# Patient Record
Sex: MALE | Race: WHITE | NOT HISPANIC OR LATINO | Employment: FULL TIME | URBAN - METROPOLITAN AREA
[De-identification: names, ages, dates, MRNs, and addresses within clinical notes are randomized per-mention and may not be internally consistent; named-entity substitution may affect disease eponyms.]

---

## 2024-04-28 ENCOUNTER — HOSPITAL ENCOUNTER (OUTPATIENT)
Facility: HOSPITAL | Age: 39
Setting detail: OBSERVATION
Discharge: HOME | End: 2024-04-29
Attending: EMERGENCY MEDICINE | Admitting: SURGERY

## 2024-04-28 ENCOUNTER — APPOINTMENT (OUTPATIENT)
Dept: RADIOLOGY | Facility: HOSPITAL | Age: 39
End: 2024-04-28

## 2024-04-28 DIAGNOSIS — L02.215 PERINEAL ABSCESS: ICD-10-CM

## 2024-04-28 DIAGNOSIS — L02.214 ABSCESS OF LEFT GROIN: Primary | ICD-10-CM

## 2024-04-28 LAB
ABO GROUP (TYPE) IN BLOOD: NORMAL
ALBUMIN SERPL BCP-MCNC: 4 G/DL (ref 3.4–5)
ALP SERPL-CCNC: 30 U/L (ref 33–120)
ALT SERPL W P-5'-P-CCNC: 31 U/L (ref 10–52)
AMORPH CRY #/AREA UR COMP ASSIST: ABNORMAL /HPF
ANION GAP SERPL CALC-SCNC: 13 MMOL/L (ref 10–20)
ANTIBODY SCREEN: NORMAL
APPEARANCE UR: ABNORMAL
APTT PPP: 38 SECONDS (ref 27–38)
AST SERPL W P-5'-P-CCNC: 28 U/L (ref 9–39)
BACTERIA #/AREA URNS AUTO: ABNORMAL /HPF
BASOPHILS # BLD AUTO: 0.08 X10*3/UL (ref 0–0.1)
BASOPHILS NFR BLD AUTO: 0.3 %
BILIRUB SERPL-MCNC: 0.7 MG/DL (ref 0–1.2)
BILIRUB UR STRIP.AUTO-MCNC: NEGATIVE MG/DL
BUN SERPL-MCNC: 12 MG/DL (ref 6–23)
CALCIUM SERPL-MCNC: 9 MG/DL (ref 8.6–10.3)
CHLORIDE SERPL-SCNC: 101 MMOL/L (ref 98–107)
CO2 SERPL-SCNC: 24 MMOL/L (ref 21–32)
COLOR UR: ABNORMAL
CREAT SERPL-MCNC: 0.94 MG/DL (ref 0.5–1.3)
CRP SERPL-MCNC: 18.85 MG/DL
EGFRCR SERPLBLD CKD-EPI 2021: >90 ML/MIN/1.73M*2
EOSINOPHIL # BLD AUTO: 0.03 X10*3/UL (ref 0–0.7)
EOSINOPHIL NFR BLD AUTO: 0.1 %
ERYTHROCYTE [DISTWIDTH] IN BLOOD BY AUTOMATED COUNT: 12.5 % (ref 11.5–14.5)
ERYTHROCYTE [SEDIMENTATION RATE] IN BLOOD BY WESTERGREN METHOD: 101 MM/H (ref 0–15)
GLUCOSE SERPL-MCNC: 104 MG/DL (ref 74–99)
GLUCOSE UR STRIP.AUTO-MCNC: NEGATIVE MG/DL
HCT VFR BLD AUTO: 41.8 % (ref 41–52)
HGB BLD-MCNC: 13.9 G/DL (ref 13.5–17.5)
HOLD SPECIMEN: NORMAL
IMM GRANULOCYTES # BLD AUTO: 0.15 X10*3/UL (ref 0–0.7)
IMM GRANULOCYTES NFR BLD AUTO: 0.6 % (ref 0–0.9)
INR PPP: 1.3 (ref 0.9–1.1)
KETONES UR STRIP.AUTO-MCNC: NEGATIVE MG/DL
LACTATE SERPL-SCNC: 1 MMOL/L (ref 0.4–2)
LEUKOCYTE ESTERASE UR QL STRIP.AUTO: NEGATIVE
LYMPHOCYTES # BLD AUTO: 2.65 X10*3/UL (ref 1.2–4.8)
LYMPHOCYTES NFR BLD AUTO: 11.1 %
MCH RBC QN AUTO: 29.9 PG (ref 26–34)
MCHC RBC AUTO-ENTMCNC: 33.3 G/DL (ref 32–36)
MCV RBC AUTO: 90 FL (ref 80–100)
MONOCYTES # BLD AUTO: 2.32 X10*3/UL (ref 0.1–1)
MONOCYTES NFR BLD AUTO: 9.7 %
MUCOUS THREADS #/AREA URNS AUTO: ABNORMAL /LPF
NEUTROPHILS # BLD AUTO: 18.71 X10*3/UL (ref 1.2–7.7)
NEUTROPHILS NFR BLD AUTO: 78.2 %
NITRITE UR QL STRIP.AUTO: NEGATIVE
NRBC BLD-RTO: 0 /100 WBCS (ref 0–0)
PH UR STRIP.AUTO: 8 [PH]
PLATELET # BLD AUTO: 247 X10*3/UL (ref 150–450)
POTASSIUM SERPL-SCNC: 4 MMOL/L (ref 3.5–5.3)
PROT SERPL-MCNC: 8.3 G/DL (ref 6.4–8.2)
PROT UR STRIP.AUTO-MCNC: ABNORMAL MG/DL
PROTHROMBIN TIME: 14.2 SECONDS (ref 9.8–12.8)
RBC # BLD AUTO: 4.65 X10*6/UL (ref 4.5–5.9)
RBC # UR STRIP.AUTO: NEGATIVE /UL
RBC #/AREA URNS AUTO: ABNORMAL /HPF
RH FACTOR (ANTIGEN D): NORMAL
SODIUM SERPL-SCNC: 134 MMOL/L (ref 136–145)
SP GR UR STRIP.AUTO: 1.02
UROBILINOGEN UR STRIP.AUTO-MCNC: 4 MG/DL
WBC # BLD AUTO: 23.9 X10*3/UL (ref 4.4–11.3)
WBC #/AREA URNS AUTO: ABNORMAL /HPF

## 2024-04-28 PROCEDURE — 10061 I&D ABSCESS COMP/MULTIPLE: CPT | Performed by: SURGERY

## 2024-04-28 PROCEDURE — 87070 CULTURE OTHR SPECIMN AEROBIC: CPT | Mod: GEALAB | Performed by: NURSE PRACTITIONER

## 2024-04-28 PROCEDURE — 80053 COMPREHEN METABOLIC PANEL: CPT | Performed by: NURSE PRACTITIONER

## 2024-04-28 PROCEDURE — 85652 RBC SED RATE AUTOMATED: CPT | Performed by: NURSE PRACTITIONER

## 2024-04-28 PROCEDURE — 99222 1ST HOSP IP/OBS MODERATE 55: CPT | Performed by: SURGERY

## 2024-04-28 PROCEDURE — G0378 HOSPITAL OBSERVATION PER HR: HCPCS

## 2024-04-28 PROCEDURE — 99285 EMERGENCY DEPT VISIT HI MDM: CPT | Mod: 25

## 2024-04-28 PROCEDURE — 81001 URINALYSIS AUTO W/SCOPE: CPT | Performed by: NURSE PRACTITIONER

## 2024-04-28 PROCEDURE — 36415 COLL VENOUS BLD VENIPUNCTURE: CPT | Performed by: NURSE PRACTITIONER

## 2024-04-28 PROCEDURE — 86140 C-REACTIVE PROTEIN: CPT | Performed by: NURSE PRACTITIONER

## 2024-04-28 PROCEDURE — 86901 BLOOD TYPING SEROLOGIC RH(D): CPT | Performed by: NURSE PRACTITIONER

## 2024-04-28 PROCEDURE — 96375 TX/PRO/DX INJ NEW DRUG ADDON: CPT

## 2024-04-28 PROCEDURE — 74177 CT ABD & PELVIS W/CONTRAST: CPT

## 2024-04-28 PROCEDURE — 96367 TX/PROPH/DG ADDL SEQ IV INF: CPT

## 2024-04-28 PROCEDURE — 2550000001 HC RX 255 CONTRASTS: Performed by: EMERGENCY MEDICINE

## 2024-04-28 PROCEDURE — 2500000004 HC RX 250 GENERAL PHARMACY W/ HCPCS (ALT 636 FOR OP/ED)

## 2024-04-28 PROCEDURE — 83605 ASSAY OF LACTIC ACID: CPT | Performed by: NURSE PRACTITIONER

## 2024-04-28 PROCEDURE — 85025 COMPLETE CBC W/AUTO DIFF WBC: CPT | Performed by: NURSE PRACTITIONER

## 2024-04-28 PROCEDURE — 2500000004 HC RX 250 GENERAL PHARMACY W/ HCPCS (ALT 636 FOR OP/ED): Performed by: SURGERY

## 2024-04-28 PROCEDURE — 74177 CT ABD & PELVIS W/CONTRAST: CPT | Performed by: RADIOLOGY

## 2024-04-28 PROCEDURE — 96372 THER/PROPH/DIAG INJ SC/IM: CPT | Performed by: SURGERY

## 2024-04-28 PROCEDURE — 87040 BLOOD CULTURE FOR BACTERIA: CPT | Mod: GEALAB | Performed by: NURSE PRACTITIONER

## 2024-04-28 PROCEDURE — 85610 PROTHROMBIN TIME: CPT | Performed by: NURSE PRACTITIONER

## 2024-04-28 PROCEDURE — 2500000004 HC RX 250 GENERAL PHARMACY W/ HCPCS (ALT 636 FOR OP/ED): Performed by: NURSE PRACTITIONER

## 2024-04-28 PROCEDURE — A4217 STERILE WATER/SALINE, 500 ML: HCPCS | Performed by: NURSE PRACTITIONER

## 2024-04-28 PROCEDURE — 2500000001 HC RX 250 WO HCPCS SELF ADMINISTERED DRUGS (ALT 637 FOR MEDICARE OP): Performed by: SURGERY

## 2024-04-28 PROCEDURE — 10060 I&D ABSCESS SIMPLE/SINGLE: CPT

## 2024-04-28 PROCEDURE — 96365 THER/PROPH/DIAG IV INF INIT: CPT

## 2024-04-28 RX ORDER — KETOROLAC TROMETHAMINE 15 MG/ML
15 INJECTION, SOLUTION INTRAMUSCULAR; INTRAVENOUS EVERY 6 HOURS
Status: DISCONTINUED | OUTPATIENT
Start: 2024-04-28 | End: 2024-04-29 | Stop reason: HOSPADM

## 2024-04-28 RX ORDER — ACETAMINOPHEN 325 MG/1
650 TABLET ORAL EVERY 6 HOURS
Status: DISCONTINUED | OUTPATIENT
Start: 2024-04-28 | End: 2024-04-29 | Stop reason: HOSPADM

## 2024-04-28 RX ORDER — LORAZEPAM 2 MG/ML
1 INJECTION INTRAMUSCULAR ONCE
Status: COMPLETED | OUTPATIENT
Start: 2024-04-28 | End: 2024-04-28

## 2024-04-28 RX ORDER — KETOROLAC TROMETHAMINE 15 MG/ML
15 INJECTION, SOLUTION INTRAMUSCULAR; INTRAVENOUS ONCE
Status: COMPLETED | OUTPATIENT
Start: 2024-04-28 | End: 2024-04-28

## 2024-04-28 RX ORDER — ACETAMINOPHEN 160 MG/5ML
650 SOLUTION ORAL EVERY 6 HOURS
Status: DISCONTINUED | OUTPATIENT
Start: 2024-04-28 | End: 2024-04-29 | Stop reason: HOSPADM

## 2024-04-28 RX ORDER — METRONIDAZOLE 500 MG/100ML
500 INJECTION, SOLUTION INTRAVENOUS ONCE
Status: COMPLETED | OUTPATIENT
Start: 2024-04-28 | End: 2024-04-28

## 2024-04-28 RX ORDER — ONDANSETRON HYDROCHLORIDE 2 MG/ML
4 INJECTION, SOLUTION INTRAVENOUS EVERY 8 HOURS PRN
Status: DISCONTINUED | OUTPATIENT
Start: 2024-04-28 | End: 2024-04-29 | Stop reason: HOSPADM

## 2024-04-28 RX ORDER — DOCUSATE SODIUM 50 MG/5ML
100 LIQUID ORAL 2 TIMES DAILY
Status: DISCONTINUED | OUTPATIENT
Start: 2024-04-28 | End: 2024-04-29 | Stop reason: HOSPADM

## 2024-04-28 RX ORDER — SODIUM CHLORIDE, SODIUM LACTATE, POTASSIUM CHLORIDE, CALCIUM CHLORIDE 600; 310; 30; 20 MG/100ML; MG/100ML; MG/100ML; MG/100ML
50 INJECTION, SOLUTION INTRAVENOUS CONTINUOUS
Status: DISCONTINUED | OUTPATIENT
Start: 2024-04-28 | End: 2024-04-29 | Stop reason: HOSPADM

## 2024-04-28 RX ORDER — VANCOMYCIN HYDROCHLORIDE 1 G/20ML
INJECTION, POWDER, LYOPHILIZED, FOR SOLUTION INTRAVENOUS DAILY PRN
Status: DISCONTINUED | OUTPATIENT
Start: 2024-04-28 | End: 2024-04-28

## 2024-04-28 RX ORDER — ENOXAPARIN SODIUM 100 MG/ML
40 INJECTION SUBCUTANEOUS EVERY 24 HOURS
Status: DISCONTINUED | OUTPATIENT
Start: 2024-04-28 | End: 2024-04-29 | Stop reason: HOSPADM

## 2024-04-28 RX ORDER — LIDOCAINE HYDROCHLORIDE 10 MG/ML
5 INJECTION INFILTRATION; PERINEURAL ONCE
Status: DISCONTINUED | OUTPATIENT
Start: 2024-04-28 | End: 2024-04-29 | Stop reason: HOSPADM

## 2024-04-28 RX ORDER — ONDANSETRON 4 MG/1
4 TABLET, ORALLY DISINTEGRATING ORAL EVERY 8 HOURS PRN
Status: DISCONTINUED | OUTPATIENT
Start: 2024-04-28 | End: 2024-04-29 | Stop reason: HOSPADM

## 2024-04-28 RX ORDER — VANCOMYCIN HYDROCHLORIDE 1 G/20ML
INJECTION, POWDER, LYOPHILIZED, FOR SOLUTION INTRAVENOUS DAILY PRN
Status: DISCONTINUED | OUTPATIENT
Start: 2024-04-28 | End: 2024-04-29 | Stop reason: HOSPADM

## 2024-04-28 RX ORDER — LORAZEPAM 2 MG/ML
INJECTION INTRAMUSCULAR
Status: COMPLETED
Start: 2024-04-28 | End: 2024-04-28

## 2024-04-28 RX ORDER — LIDOCAINE HYDROCHLORIDE 10 MG/ML
INJECTION INFILTRATION; PERINEURAL
Status: DISCONTINUED
Start: 2024-04-28 | End: 2024-04-28 | Stop reason: WASHOUT

## 2024-04-28 RX ORDER — ACETAMINOPHEN 650 MG/1
650 SUPPOSITORY RECTAL EVERY 6 HOURS
Status: DISCONTINUED | OUTPATIENT
Start: 2024-04-28 | End: 2024-04-29 | Stop reason: HOSPADM

## 2024-04-28 RX ADMIN — KETOROLAC TROMETHAMINE 15 MG: 15 INJECTION, SOLUTION INTRAMUSCULAR; INTRAVENOUS at 10:50

## 2024-04-28 RX ADMIN — METRONIDAZOLE 500 MG: 500 INJECTION, SOLUTION INTRAVENOUS at 16:42

## 2024-04-28 RX ADMIN — ENOXAPARIN SODIUM 40 MG: 40 INJECTION SUBCUTANEOUS at 15:55

## 2024-04-28 RX ADMIN — DOCUSATE SODIUM LIQUID 100 MG: 100 LIQUID ORAL at 20:23

## 2024-04-28 RX ADMIN — SODIUM CHLORIDE, POTASSIUM CHLORIDE, SODIUM LACTATE AND CALCIUM CHLORIDE 50 ML/HR: 600; 310; 30; 20 INJECTION, SOLUTION INTRAVENOUS at 16:42

## 2024-04-28 RX ADMIN — LORAZEPAM 1 MG: 2 INJECTION INTRAMUSCULAR at 13:37

## 2024-04-28 RX ADMIN — PIPERACILLIN SODIUM AND TAZOBACTAM SODIUM 3.38 G: 3; .375 INJECTION, SOLUTION INTRAVENOUS at 10:51

## 2024-04-28 RX ADMIN — IOHEXOL 75 ML: 350 INJECTION, SOLUTION INTRAVENOUS at 11:31

## 2024-04-28 RX ADMIN — VANCOMYCIN HYDROCHLORIDE 2000 MG: 5 INJECTION, POWDER, LYOPHILIZED, FOR SOLUTION INTRAVENOUS at 11:33

## 2024-04-28 RX ADMIN — ACETAMINOPHEN 650 MG: 325 TABLET ORAL at 20:23

## 2024-04-28 RX ADMIN — LORAZEPAM 1 MG: 2 INJECTION INTRAMUSCULAR; INTRAVENOUS at 13:37

## 2024-04-28 RX ADMIN — PIPERACILLIN SODIUM AND TAZOBACTAM SODIUM 3.38 G: 3; .375 INJECTION, SOLUTION INTRAVENOUS at 23:58

## 2024-04-28 RX ADMIN — PIPERACILLIN SODIUM AND TAZOBACTAM SODIUM 3.38 G: 3; .375 INJECTION, SOLUTION INTRAVENOUS at 17:56

## 2024-04-28 RX ADMIN — SODIUM CHLORIDE 1000 ML: 9 INJECTION, SOLUTION INTRAVENOUS at 10:50

## 2024-04-28 SDOH — SOCIAL STABILITY: SOCIAL INSECURITY: WERE YOU ABLE TO COMPLETE ALL THE BEHAVIORAL HEALTH SCREENINGS?: YES

## 2024-04-28 SDOH — SOCIAL STABILITY: SOCIAL INSECURITY: HAVE YOU HAD THOUGHTS OF HARMING ANYONE ELSE?: NO

## 2024-04-28 SDOH — SOCIAL STABILITY: SOCIAL INSECURITY: DO YOU FEEL UNSAFE GOING BACK TO THE PLACE WHERE YOU ARE LIVING?: NO

## 2024-04-28 SDOH — SOCIAL STABILITY: SOCIAL INSECURITY: ABUSE: ADULT

## 2024-04-28 SDOH — SOCIAL STABILITY: SOCIAL INSECURITY: DOES ANYONE TRY TO KEEP YOU FROM HAVING/CONTACTING OTHER FRIENDS OR DOING THINGS OUTSIDE YOUR HOME?: NO

## 2024-04-28 SDOH — SOCIAL STABILITY: SOCIAL INSECURITY: ARE THERE ANY APPARENT SIGNS OF INJURIES/BEHAVIORS THAT COULD BE RELATED TO ABUSE/NEGLECT?: NO

## 2024-04-28 SDOH — SOCIAL STABILITY: SOCIAL INSECURITY: ARE YOU OR HAVE YOU BEEN THREATENED OR ABUSED PHYSICALLY, EMOTIONALLY, OR SEXUALLY BY ANYONE?: NO

## 2024-04-28 SDOH — SOCIAL STABILITY: SOCIAL INSECURITY: HAVE YOU HAD ANY THOUGHTS OF HARMING ANYONE ELSE?: NO

## 2024-04-28 SDOH — SOCIAL STABILITY: SOCIAL INSECURITY: HAS ANYONE EVER THREATENED TO HURT YOUR FAMILY OR YOUR PETS?: NO

## 2024-04-28 ASSESSMENT — ACTIVITIES OF DAILY LIVING (ADL)
JUDGMENT_ADEQUATE_SAFELY_COMPLETE_DAILY_ACTIVITIES: YES
PATIENT'S MEMORY ADEQUATE TO SAFELY COMPLETE DAILY ACTIVITIES?: YES
DRESSING YOURSELF: INDEPENDENT
FEEDING YOURSELF: INDEPENDENT
GROOMING: INDEPENDENT
HEARING - LEFT EAR: FUNCTIONAL
LACK_OF_TRANSPORTATION: NO
WALKS IN HOME: INDEPENDENT
HEARING - RIGHT EAR: FUNCTIONAL
ADEQUATE_TO_COMPLETE_ADL: YES
TOILETING: INDEPENDENT
BATHING: INDEPENDENT

## 2024-04-28 ASSESSMENT — COGNITIVE AND FUNCTIONAL STATUS - GENERAL
DAILY ACTIVITIY SCORE: 24
MOBILITY SCORE: 24
MOBILITY SCORE: 24
DAILY ACTIVITIY SCORE: 24
PATIENT BASELINE BEDBOUND: NO

## 2024-04-28 ASSESSMENT — PAIN - FUNCTIONAL ASSESSMENT
PAIN_FUNCTIONAL_ASSESSMENT: 0-10

## 2024-04-28 ASSESSMENT — LIFESTYLE VARIABLES
EVER FELT BAD OR GUILTY ABOUT YOUR DRINKING: NO
HOW OFTEN DO YOU HAVE A DRINK CONTAINING ALCOHOL: NEVER
HAVE YOU EVER FELT YOU SHOULD CUT DOWN ON YOUR DRINKING: NO
AUDIT-C TOTAL SCORE: 0
AUDIT-C TOTAL SCORE: 0
HAVE PEOPLE ANNOYED YOU BY CRITICIZING YOUR DRINKING: NO
SKIP TO QUESTIONS 9-10: 1
HOW MANY STANDARD DRINKS CONTAINING ALCOHOL DO YOU HAVE ON A TYPICAL DAY: PATIENT DOES NOT DRINK
HOW OFTEN DO YOU HAVE 6 OR MORE DRINKS ON ONE OCCASION: NEVER
TOTAL SCORE: 0
EVER HAD A DRINK FIRST THING IN THE MORNING TO STEADY YOUR NERVES TO GET RID OF A HANGOVER: NO

## 2024-04-28 ASSESSMENT — PAIN SCALES - GENERAL
PAINLEVEL_OUTOF10: 9
PAINLEVEL_OUTOF10: 0 - NO PAIN
PAINLEVEL_OUTOF10: 0 - NO PAIN
PAINLEVEL_OUTOF10: 5 - MODERATE PAIN

## 2024-04-28 ASSESSMENT — PATIENT HEALTH QUESTIONNAIRE - PHQ9
2. FEELING DOWN, DEPRESSED OR HOPELESS: NOT AT ALL
1. LITTLE INTEREST OR PLEASURE IN DOING THINGS: NOT AT ALL
SUM OF ALL RESPONSES TO PHQ9 QUESTIONS 1 & 2: 0

## 2024-04-28 ASSESSMENT — COLUMBIA-SUICIDE SEVERITY RATING SCALE - C-SSRS
1. IN THE PAST MONTH, HAVE YOU WISHED YOU WERE DEAD OR WISHED YOU COULD GO TO SLEEP AND NOT WAKE UP?: NO
2. HAVE YOU ACTUALLY HAD ANY THOUGHTS OF KILLING YOURSELF?: NO
6. HAVE YOU EVER DONE ANYTHING, STARTED TO DO ANYTHING, OR PREPARED TO DO ANYTHING TO END YOUR LIFE?: NO

## 2024-04-28 NOTE — CARE PLAN
Problem: Pain  Goal: Takes deep breaths with improved pain control throughout the shift  Outcome: Progressing  Goal: Turns in bed with improved pain control throughout the shift  Outcome: Progressing  Goal: Walks with improved pain control throughout the shift  Outcome: Progressing  Goal: Performs ADL's with improved pain control throughout shift  Outcome: Progressing  Goal: Participates in PT with improved pain control throughout the shift  Outcome: Progressing  Goal: Free from opioid side effects throughout the shift  Outcome: Progressing  Goal: Free from acute confusion related to pain meds throughout the shift  Outcome: Progressing   The patient's goals for the shift include      The clinical goals for the shift include keep pt free of injury    Patient reports no pain and is resting comfortably

## 2024-04-28 NOTE — ED PROCEDURE NOTE
Date:  24     Name: Sherman Marsh, : 1985, Age: 38 y.o., MRN: [id number], Sex: male    Diagnosis  Left perineal abscess, recurrent    Procedures  Incision and drainage of left perineal abscess    Surgeons   Dr. Larry Wilson    Resident/Fellow/Other Assistant:  None    Procedure Summary  Anesthesia: Local anesthetic     Estimated Blood Loss: 2 mL  Intra-op Medications: Ativan 1 mg, 1% lidocaine 8 mL      Specimen: Aerobic and anaerobic wound cultures    Findings: Copious foul-smelling brown pus    Complications:  None; patient tolerated the procedure well.       Condition: stable    Brief history: Sherman Marsh presented to the emergency department with recurrent left perineal abscess.  He had this drained less than a year ago by Dr. Mora.  He presents with a recurrence, though smaller in size and extent than the preceding episode.  The risks and benefits of bedside drainage procedure were explained to the patient and he expressed understanding and consent was signed.    procedure in detail: The patient was appropriately identified and positioned in the left lateral decubitus position and his ER room.  He was given 1 mg of IV Ativan.  The left perineal area was prepped with chlorhexidine.  Then about a mL of 1% lidocaine were injected into the subcutaneous tissue and skin overlying the area of greatest induration.  A 2 cm incision was made with a scalpel.  We did not immediately express pus.  So a 21-gauge needle was inserted deeper through the incision site to localize the deeper pocket of abscess.  We then used a hemostat to bluntly dissect down to the pocket through her incision site.  Copious foul-smelling brown pus was expressed.  Wound cultures were obtained.  We probed the abscess cavity gently with a hemostat to break up any loculations.  We irrigated out the abscess cavity with about 20 cc of sterile saline.  We pushed externally from all sides to express any residual pus.  Then packed the  wound with half-inch iodoform packing strip and covered the incision with an dry gauze dressing and Mepilex.  The patient tolerated the procedure without difficulty and will be admitted to the hospital for IV antibiotics and observation.    Larry Wilson MD  Bariatric and Minimally Invasive General Surgery

## 2024-04-28 NOTE — ED PROVIDER NOTES
HPI   Chief Complaint   Patient presents with    Abscess     Pt states on Friday he noticed a painful red lump on his left inner buttock. States 1 yr ago he had the same thing happen in the same spot, but had to go the OR to have it removed and had a drain placed.        38-year-old male presents today with concern for rectal abscess and groin abscess and he presently rates the pain 5 out of 10 if he does not sit on the abscess.  If he sits on the abscess it goes to 9 out of 10.  The abscess radiates from his buttocks to his groin.  He had a similar presentation 1 year ago which required surgery and a drain placement.  He denies fever.  He denies chills.  He felt nauseous last night but he did not vomit.  He is not on any medication.  Urination is normal.  He denies hematuria.  He had a normal bowel movement but it was painful.  He denies melena or hematochezia.  His vital signs are stable but he was tachycardic in triage with a heart rate of 119 bpm.  He was slightly hypertensive with a blood pressure 135/96 and this could be pain related.  I offered patient morphine and Zofran and he declined indicating he did not want any narcotic medication.  The last time patient ate anything was last evening.  He did not have anything to eat or drink today.  Original drain 1 year ago was placed by Dr. Mora.      History provided by:  Patient and spouse   used: No                        [place] Coma Scale Score: 15                     Patient History   History reviewed. No pertinent past medical history.  History reviewed. No pertinent surgical history.  No family history on file.  Social History     Tobacco Use    Smoking status: Every Day     Types: Cigarettes    Smokeless tobacco: Never   Substance Use Topics    Alcohol use: Not on file    Drug use: Not on file       Physical Exam   ED Triage Vitals [04/28/24 1020]   Temperature Heart Rate Respirations BP   36.8 °C (98.2 °F) (!) 119 16 (!) 135/96      Pulse  Ox Temp src Heart Rate Source Patient Position   98 % -- -- --      BP Location FiO2 (%)     -- --       Physical Exam  Constitutional:       Appearance: He is obese.   HENT:      Head: Normocephalic and atraumatic.      Right Ear: Tympanic membrane normal.      Left Ear: Tympanic membrane normal.      Nose: Nose normal.      Mouth/Throat:      Mouth: Mucous membranes are moist.   Eyes:      Extraocular Movements: Extraocular movements intact.      Pupils: Pupils are equal, round, and reactive to light.   Cardiovascular:      Rate and Rhythm: Normal rate.      Pulses: Normal pulses.      Heart sounds: Normal heart sounds.   Pulmonary:      Effort: Pulmonary effort is normal.      Breath sounds: Normal breath sounds.   Abdominal:      General: Abdomen is flat.      Palpations: Abdomen is soft.   Genitourinary:     Comments: Scrotum is erythematous and tender to the touch with an abscess on the left side radiating to the buttocks  Musculoskeletal:         General: Normal range of motion.      Cervical back: Normal range of motion.   Skin:     General: Skin is warm.      Capillary Refill: Capillary refill takes less than 2 seconds.      Comments: Concern for rectal abscess and scrotal abscess that is inflamed and tender to the touch   Neurological:      General: No focal deficit present.      Mental Status: He is alert and oriented to person, place, and time.   Psychiatric:         Mood and Affect: Mood normal.         Behavior: Behavior normal.         ED Course & MDM   Diagnoses as of 04/28/24 1545   Abscess of left groin       Medical Decision Making  Patient was tachycardic with a heart rate of 119 bpm I wrote for 1 L normal saline.  Blood cultures were ordered.  He was covered with vancomycin and Zosyn with concern for either rectal abscess or Partha's gangrene.  He had inflamed groin and went and it was tender to the touch and it radiated to the rectum.  He was slightly hypertensive with a blood pressure 135/96  and this could be pain related.  He had a prior history which required surgical drainage by Dr. Mora.  Patient was staffed with attending who saw patient immediately.  Patient's vital signs were rechecked 5 times and remained stable.  He was covered with vancomycin and Zosyn and when general surgeon saw patient we added Flagyl.  General surgeon drained abscess bedside and wound culture was sent to the lab.  Patient's urinalysis had urobilinogen and +1 protein.  His appearance was hazy.  Leukocytes and nitrates were negative.  I did type and screen patient in case he had to go to surgery.  His sed rate was elevated at 101.  His metabolic panel had alk phosphatase of 30 and total protein of 8.3.  His glucose was 104 and his sodium was 134.  His CRP was elevated at 18.85.  His lactate was normal.  His INR was 1.3.  He had leukocytosis with a white count of 23.9 and a neutrophil count of 18.71.  His monocyte count was 2.32.  CT of the abdomen pelvis showed persistent but smaller or recurrent but smaller left-sided perineal abscess probably emerging from the left side of the anal verge and extending down into the perineum.  The collection was 8 cm anterior to posterior 1 cm transverse and 6.7 m craniocaudal.  The only gas bubbles involved were within the collection.  When general surgery drain she indicated that she had a return of her brown substance.  General surgery asked for us to admit directly to her service so she can follow closely with patient.  Patient agreed and was moved to hospital room without any further complications.    Amount and/or Complexity of Data Reviewed  Labs: ordered.  Radiology: ordered and independent interpretation performed.        Procedure  Procedures     ARMANDO Robert  04/28/24 1889       MARILOU Robert-CNP  04/28/24 6082

## 2024-04-28 NOTE — PROGRESS NOTES
The patient was seen by the midlevel/resident.  I have personally saw the patient and made/approved the management plan and take responsibility for the patient management.  I reviewed the EKG's (when done) and agree with the interpretation.  I have seen and examined the patient; agree with the workup, evaluation, MDM, and diagnosis.  The care plan has been discussed with the midlevel/resident; I have reviewed the note and agree with the documented findings.     Presents with swelling pain in the left inguinal area.  Patient has what appears to be an abscess near his rectum that extends anteriorly on the left portion of his inguinal area.  He had a similar infection about a year ago that required a drain by Dr. Mora.  Patient denies diabetes.  He denies any fever chills cough or cold.  Plan is to do CT scan labs and start IV antibiotics.  Will consult surgery once we have the results back anticipate hospitalization.  Spoke to patient and the other individual in the room.  My exam was done with female chaperone present.    Surgeon came to the ED and performed an incision and placed a drain.  Patient was then hospitalized under her service.  Diagnoses as of 04/28/24 1638   Abscess of left groin     Regan Hillman MD

## 2024-04-28 NOTE — H&P
GENERAL SURGERY/TRAUMA  HISTORY AND PHYSICAL/CONSULT    Date: 4/28/2024 Time: 2:15 PM    Name: Sherman Marsh  MRN: [id number]    This is a 38 y.o. male with recurrent left perineal abscess.  He had a similar abscess, but larger, a little under a year ago, drained in the OR by Dr. Mora. He began to feel discomfort and swelling 2 days ago. He denies trauma to the area or anal sex. He has no family hx of IBD.      PAST MEDICAL HISTORY:  History reviewed. No pertinent past medical history.     PAST SURGICAL HISTORY:  History reviewed. No pertinent surgical history.  As above    FAMILY HISTORY:  No family history on file.     SOCIAL HISTORY:  Social History     Tobacco Use    Smoking status: Every Day     Types: Cigarettes    Smokeless tobacco: Never       MEDICATIONS:  Prior to Admission Medications:    Current Facility-Administered Medications:     acetaminophen (Tylenol) tablet 650 mg, 650 mg, oral, q6h **OR** acetaminophen (Tylenol) oral liquid 650 mg, 650 mg, nasogastric tube, q6h **OR** acetaminophen (Tylenol) suppository 650 mg, 650 mg, rectal, q6h, Larry Wilson MD    docusate sodium (Colace) oral liquid 100 mg, 100 mg, oral, BID, Larry Wilson MD    enoxaparin (Lovenox) syringe 40 mg, 40 mg, subcutaneous, q24h, Larry Wilson MD    ketorolac (Toradol) injection 15 mg, 15 mg, intravenous, q6h, Larry Wilson MD    lactated Ringer's infusion, 50 mL/hr, intravenous, Continuous, Larry Wilson MD    lidocaine (Xylocaine) 10 mg/mL (1 %) injection 50 mg, 5 mL, subcutaneous, Once, ARMANDO Robert    metroNIDAZOLE (Flagyl) 500 mg in NaCl (iso-os) 100 mL, 500 mg, intravenous, Once, ARMANDO Robert    ondansetron ODT (Zofran-ODT) disintegrating tablet 4 mg, 4 mg, oral, q8h PRN **OR** ondansetron (Zofran) injection 4 mg, 4 mg, intravenous, q8h PRN, Larry Wilson MD    piperacillin-tazobactam-dextrose (Zosyn) IV 3.375 g, 3.375 g, intravenous, q6h, Larry Wilson MD    vancomycin (Vancocin)  pharmacy to dose - pharmacy monitoring, , miscellaneous, Daily PRN, Larry Wilson MD  No current outpatient medications on file.    ALLERGIES:  No Known Allergies    REVIEW OF SYSTEMS:  GENERAL: Negative for malaise, significant weight loss and fever  NECK: Negative for lumps, goiter, pain and significant neck swelling  RESPIRATORY: Negative for cough, wheezing or shortness of breath.  CARDIOVASCULAR: Negative for chest pain, leg swelling or palpitations.  GI: Negative for abdominal discomfort, blood in stools or black stools or change in bowel habits  : No history of dysuria, frequency or incontinence  MUSCULOSKELETAL: Negative for joint pain or swelling, back pain or muscle pain.  SKIN: erythema, induration, and pain in left perineum extending to scrotum  PSYCH: Negative for sleep disturbance, mood disorder and recent psychosocial stressors.  ENDOCRINE: Negative for cold or heat intolerance, polyuria, polydipsia and goiter.    PHYSICAL EXAM:  Vitals:    04/28/24 1411   BP: (!) 114/95   Pulse: 88   Resp: 16   Temp:    SpO2:      General appearance: obese  Skin: erythema, induration, fluctuance, and tenderness extending from anus to scrotum along left perineum  Lungs: clear to percussion and auscultation  Heart: regular rhythm and S1, S2 normal  Abdomen: soft, nondistended, nontender  Extremities: Normal exam of the extremities. No swelling or pain.    IMPRESSION:  Sherman Marsh is a 38 y.o. male with recurrent left perineal abscess.    PLAN:  I&D performed in the ER. Packed with 1/2 in iodoform gauze.  Wound cultures obtained.  Coverage with broad spectrum IV abx.  Infectious disease consulted.  Wound care nurse consulted.  Plan for discharge tomorrow once care coordinated.     The risks of the procedure including bleeding, infection, injury to neighboring organ, prolonged hospitalization, need for further procedure and death have been explained to the patient and Sheramn Marsh has expressed  understanding and acceptance of them. Consent has been signed.    Larry Wilson MD  Bariatric and Minimally Invasive General Surgery

## 2024-04-28 NOTE — CONSULTS
"Vancomycin Dosing by Pharmacy- INITIAL    Sherman Marsh is a 38 y.o. year old male who Pharmacy has been consulted for vancomycin dosing for cellulitis, skin and soft tissue. Based on the patient's indication and renal status this patient will be dosed based on a goal AUC of 400-600.     Renal function is currently stable.    Visit Vitals  BP (!) 148/91   Pulse 101   Temp 37 °C (98.6 °F) (Temporal)   Resp 20        Lab Results   Component Value Date    CREATININE 0.94 04/28/2024    CREATININE 1.01 06/27/2023        Patient weight is No results found for: \"PTWEIGHT\"    No results found for: \"CULTURE\"     No intake/output data recorded.  [unfilled]    No results found for: \"PATIENTTEMP\"       Assessment/Plan     Patient has already been given a loading dose of 2000 mg.  Will initiate vancomycin maintenance,  1250 mg every 12 hours. Continue to monitor half life- may need to increase to Q24 hours.    This dosing regimen is predicted by [employer] to result in the following pharmacokinetic parameters:'    Regimen: 1250 mg IV every 12 hours.  Start time: 23:33 on 04/28/2024  Exposure target: AUC24 (range)400-600 mg/L.hr   AUC24,ss: 457 mg/L.hr  Probability of AUC24 > 400: 64 %  Ctrough,ss: 14.8 mg/L  Probability of Ctrough,ss > 20: 25 %  Probability of nephrotoxicity (Lodise MARIOLA 2009): 10 %    Follow-up level will be ordered at 0500 on 4/29 unless clinically indicated sooner.  Will continue to monitor renal function daily while on vancomycin and order serum creatinine at least every 48 hours if not already ordered.  Follow for continued vancomycin needs, clinical response, and signs/symptoms of toxicity.       Akanksha Aaron, PharmD       "

## 2024-04-29 VITALS
HEART RATE: 82 BPM | HEIGHT: 71 IN | OXYGEN SATURATION: 97 % | RESPIRATION RATE: 16 BRPM | BODY MASS INDEX: 40.04 KG/M2 | DIASTOLIC BLOOD PRESSURE: 70 MMHG | TEMPERATURE: 97.9 F | WEIGHT: 286 LBS | SYSTOLIC BLOOD PRESSURE: 109 MMHG

## 2024-04-29 LAB
ANION GAP SERPL CALC-SCNC: 11 MMOL/L (ref 10–20)
BASOPHILS # BLD AUTO: 0.08 X10*3/UL (ref 0–0.1)
BASOPHILS NFR BLD AUTO: 0.5 %
BUN SERPL-MCNC: 14 MG/DL (ref 6–23)
CALCIUM SERPL-MCNC: 8.3 MG/DL (ref 8.6–10.3)
CHLORIDE SERPL-SCNC: 104 MMOL/L (ref 98–107)
CO2 SERPL-SCNC: 25 MMOL/L (ref 21–32)
CREAT SERPL-MCNC: 0.96 MG/DL (ref 0.5–1.3)
EGFRCR SERPLBLD CKD-EPI 2021: >90 ML/MIN/1.73M*2
EOSINOPHIL # BLD AUTO: 0.2 X10*3/UL (ref 0–0.7)
EOSINOPHIL NFR BLD AUTO: 1.2 %
ERYTHROCYTE [DISTWIDTH] IN BLOOD BY AUTOMATED COUNT: 12.7 % (ref 11.5–14.5)
GLUCOSE SERPL-MCNC: 114 MG/DL (ref 74–99)
HCT VFR BLD AUTO: 41 % (ref 41–52)
HGB BLD-MCNC: 13.3 G/DL (ref 13.5–17.5)
IMM GRANULOCYTES # BLD AUTO: 0.11 X10*3/UL (ref 0–0.7)
IMM GRANULOCYTES NFR BLD AUTO: 0.7 % (ref 0–0.9)
LYMPHOCYTES # BLD AUTO: 2.44 X10*3/UL (ref 1.2–4.8)
LYMPHOCYTES NFR BLD AUTO: 14.9 %
MCH RBC QN AUTO: 29.6 PG (ref 26–34)
MCHC RBC AUTO-ENTMCNC: 32.4 G/DL (ref 32–36)
MCV RBC AUTO: 91 FL (ref 80–100)
MONOCYTES # BLD AUTO: 1.79 X10*3/UL (ref 0.1–1)
MONOCYTES NFR BLD AUTO: 10.9 %
NEUTROPHILS # BLD AUTO: 11.77 X10*3/UL (ref 1.2–7.7)
NEUTROPHILS NFR BLD AUTO: 71.8 %
NRBC BLD-RTO: 0 /100 WBCS (ref 0–0)
PLATELET # BLD AUTO: 234 X10*3/UL (ref 150–450)
POTASSIUM SERPL-SCNC: 3.8 MMOL/L (ref 3.5–5.3)
RBC # BLD AUTO: 4.49 X10*6/UL (ref 4.5–5.9)
SODIUM SERPL-SCNC: 136 MMOL/L (ref 136–145)
VANCOMYCIN SERPL-MCNC: 13.8 UG/ML (ref 5–20)
WBC # BLD AUTO: 16.4 X10*3/UL (ref 4.4–11.3)

## 2024-04-29 PROCEDURE — 80202 ASSAY OF VANCOMYCIN: CPT | Performed by: SURGERY

## 2024-04-29 PROCEDURE — 96367 TX/PROPH/DG ADDL SEQ IV INF: CPT

## 2024-04-29 PROCEDURE — 99024 POSTOP FOLLOW-UP VISIT: CPT | Performed by: SURGERY

## 2024-04-29 PROCEDURE — 2500000004 HC RX 250 GENERAL PHARMACY W/ HCPCS (ALT 636 FOR OP/ED)

## 2024-04-29 PROCEDURE — 96361 HYDRATE IV INFUSION ADD-ON: CPT

## 2024-04-29 PROCEDURE — 85025 COMPLETE CBC W/AUTO DIFF WBC: CPT | Performed by: SURGERY

## 2024-04-29 PROCEDURE — G0378 HOSPITAL OBSERVATION PER HR: HCPCS

## 2024-04-29 PROCEDURE — 2500000004 HC RX 250 GENERAL PHARMACY W/ HCPCS (ALT 636 FOR OP/ED): Mod: JZ | Performed by: SURGERY

## 2024-04-29 PROCEDURE — 80048 BASIC METABOLIC PNL TOTAL CA: CPT | Performed by: SURGERY

## 2024-04-29 PROCEDURE — 36415 COLL VENOUS BLD VENIPUNCTURE: CPT | Performed by: SURGERY

## 2024-04-29 RX ORDER — AMOXICILLIN AND CLAVULANATE POTASSIUM 875; 125 MG/1; MG/1
1 TABLET, FILM COATED ORAL 2 TIMES DAILY
Qty: 14 TABLET | Refills: 0 | Status: SHIPPED | OUTPATIENT
Start: 2024-04-29 | End: 2024-05-06

## 2024-04-29 RX ORDER — IBUPROFEN 600 MG/1
600 TABLET ORAL EVERY 6 HOURS PRN
Start: 2024-04-29 | End: 2024-05-20 | Stop reason: ALTCHOICE

## 2024-04-29 RX ORDER — ACETAMINOPHEN 325 MG/1
650 TABLET ORAL EVERY 6 HOURS PRN
Start: 2024-04-29 | End: 2024-05-20 | Stop reason: ALTCHOICE

## 2024-04-29 RX ADMIN — AMPICILLIN SODIUM AND SULBACTAM SODIUM 3 G: 2; 1 INJECTION, POWDER, FOR SOLUTION INTRAMUSCULAR; INTRAVENOUS at 11:56

## 2024-04-29 RX ADMIN — PIPERACILLIN SODIUM AND TAZOBACTAM SODIUM 3.38 G: 3; .375 INJECTION, SOLUTION INTRAVENOUS at 06:51

## 2024-04-29 RX ADMIN — VANCOMYCIN HYDROCHLORIDE 1250 MG: 1.25 INJECTION, POWDER, LYOPHILIZED, FOR SOLUTION INTRAVENOUS at 01:11

## 2024-04-29 ASSESSMENT — COGNITIVE AND FUNCTIONAL STATUS - GENERAL
DAILY ACTIVITIY SCORE: 24
MOBILITY SCORE: 24

## 2024-04-29 ASSESSMENT — PAIN SCALES - GENERAL: PAINLEVEL_OUTOF10: 0 - NO PAIN

## 2024-04-29 ASSESSMENT — PAIN - FUNCTIONAL ASSESSMENT: PAIN_FUNCTIONAL_ASSESSMENT: 0-10

## 2024-04-29 ASSESSMENT — ACTIVITIES OF DAILY LIVING (ADL): LACK_OF_TRANSPORTATION: NO

## 2024-04-29 NOTE — PROGRESS NOTES
Vancomycin Dosing by Pharmacy- Cessation of Therapy    Consult to pharmacy for vancomycin dosing has been discontinued by the prescriber, pharmacy will sign off at this time.    Please call pharmacy if there are further questions or re-enter a consult if vancomycin is resumed.     Jin Sam, AlisiaD

## 2024-04-29 NOTE — PROGRESS NOTES
"Vancomycin Dosing by Pharmacy- FOLLOW UP    Sherman Marsh is a 38 y.o. year old male who Pharmacy has been consulted for vancomycin dosing for cellulitis, skin and soft tissue. Based on the patient's indication and renal status this patient is being dosed based on a goal AUC of 400-600.     Renal function is currently stable.    Current vancomycin dose: 1250 mg given every 12 hours    Estimated vancomycin AUC on current dose: 446 mg/L.hr     Visit Vitals  /64 (BP Location: Left arm, Patient Position: Lying)   Pulse 79   Temp 36.1 °C (97 °F) (Temporal)   Resp 19        Lab Results   Component Value Date    CREATININE 0.96 04/29/2024    CREATININE 0.94 04/28/2024    CREATININE 1.01 06/27/2023        Patient weight is No results found for: \"PTWEIGHT\"    No results found for: \"CULTURE\"     I/O last 3 completed shifts:  In: 435 (3.4 mL/kg) [I.V.:85 (0.7 mL/kg); IV Piggyback:350]  Out: - (0 mL/kg)   Weight: 129.7 kg   [unfilled]    No results found for: \"PATIENTTEMP\"     Assessment/Plan    Within goal AUC range. Continue current vancomycin regimen.    This dosing regimen is predicted by [employer] to result in the following pharmacokinetic parameters:    Loading dose: N/A  Regimen: 1250 mg IV every 12 hours.  Start time: 13:11 on 04/29/2024  Exposure target: AUC24 (range)400-600 mg/L.hr   AUC24,ss: 446 mg/L.hr  Probability of AUC24 > 400: 68 %  Ctrough,ss: 14.8 mg/L  Probability of Ctrough,ss > 20: 17 %  Probability of nephrotoxicity (Lodise MARIOLA 2009): 10 %    The next level will be obtained on 05/02/2024 at 0500. May be obtained sooner if clinically indicated.   Will continue to monitor renal function daily while on vancomycin and order serum creatinine at least every 48 hours if not already ordered.  Follow for continued vancomycin needs, clinical response, and signs/symptoms of toxicity.       Katerina Medellin, PharmD           "

## 2024-04-29 NOTE — DISCHARGE INSTRUCTIONS
Activity:   OK to walk and take stairs. Activity as tolerated.     Diet:  Regular diet as tolerated.    Wound Care:  OK to shower. Gently scrub incisions with soap and water and pat dry. Do not soak in bath or pool. Shower before your change your packing. Packing removal is easier if it is done while wet.     Remove old packing and replace with aquacel packing strip. Cover with Mepilex or even a Maxi pad in your underwear. Be sure to wash the affected area with soap and water after bowel movements and urination.    Shower twice a day to keep the area clean.    Follow Up:  Call Dr. Wilson's office at [phone] (option 2) to make a follow-up appointment in 10-14 days.  Follow up a couple times a week with the wound care center.    Notify Physician if:  redness or drainage from incisions, severe abdominal pain, fever >100F, nausea/vomiting that won't stop, chest pain, shortness of breath, or leg swelling.

## 2024-04-29 NOTE — NURSING NOTE
1357- dc instructions given and explained to patient. Educated patient on follow up appointments and wound care. IV removed. No questions asked. Pt wife here to pick him up

## 2024-04-29 NOTE — DISCHARGE SUMMARY
Discharge Diagnosis  Perineal abscess    Issues Requiring Follow-Up  Culture results    Test Results Pending At Discharge  Pending Labs       Order Current Status    Blood Culture Preliminary result    Blood Culture Preliminary result    Tissue/Wound Culture/Smear Preliminary result            Hospital Course   Sherman Marsh Is a 38-year-old male who presented to the emergency department on 4/28/2024 with a left-sided perineal abscess.  He was treated operatively for a similar abscess in the same location less than a year ago by Dr. Mora.  He underwent a uncomplicated bedside I&D by Dr. Wilson in the emergency department.  He was admitted and started on IV antibiotics.  Infectious disease was consulted and they made recommendations for an IV antibiotic change and home-going antibiotics orally until culture results returned.  Dressing was changed on hospital day #2 without difficulty.  He was discharged home on oral antibiotics and with follow-up in the wound care center.  He will ultimately need a colonoscopy to rule out Crohn's disease as the etiology of these recurrent perianal abscesses.    Home Medications     Medication List      START taking these medications     acetaminophen 325 mg tablet; Commonly known as: Tylenol; Take 2 tablets   (650 mg) by mouth every 6 hours if needed for mild pain (1 - 3).   amoxicillin-pot clavulanate 875-125 mg tablet; Commonly known as:   Augmentin; Take 1 tablet by mouth 2 times a day for 7 days.   ibuprofen 600 mg tablet; Take 1 tablet (600 mg) by mouth every 6 hours   if needed for moderate pain (4 - 6).       Outpatient Follow-Up  Future Appointments   Date Time Provider Department Center   5/2/2024  2:00 PM [facility] WOUND, WOUNDC[facility]       Larry Wilson MD

## 2024-04-29 NOTE — CONSULTS
Consults    Reason For Consult  Perineal abscess     History Of Present Illness  Sherman Marsh is a 38 y.o. male with PMHx of previous Perineal abscess a year ago presented to the ED  with the complains of pain in his anal area for 3 days. Pts symptom started with mild pain and chill on 4/25 with worsening pain next day. Became unable to walk due to pain next day as well as doing regular any activities. Denied any discharge, fever, abdominal pain, bloody stool, diarrhea, Hx of crohn's disease. Diagnosed as Perineal abscess in the ED with I& D done.  Had similar episode a year ago, abscess drained by Dr Mora.       Past Medical History  He has no past medical history on file.    Surgical History  He has no past surgical history on file.     Social History  He reports that he has been smoking cigarettes. He has never used smokeless tobacco. No history on file for alcohol use and drug use.    Family History  No family history on file.     Allergies  Patient has no known allergies.    Review of Systems  Chill ,anal pain, decreased mobility     Physical Exam  Constitutional: patient was seen and examined at the bedside and appeared calm  Eyes: PERRL bilaterally   Head: atraumatic, normocephalic  Heart: RRR, no M/R/G  Lungs: CTA, b/l, no W/R/R  Abdomen: soft, ND, NT, + BS in all 4 quadrants , I&D done , minimal discharge. No pain.   Extremities: no edema, ecchymoses, erythema   Neuro: AAOx3 to person/place/time  Psych: appropriate mood and behavior         Last Recorded Vitals  /70 (BP Location: Left arm, Patient Position: Lying)   Pulse 82   Temp 36.6 °C (97.9 °F) (Temporal)   Resp 16   Wt 130 kg (286 lb)   SpO2 97%     Relevant Results       Assessment/Plan   Sherman Marsh is a 38 y.o. male with PMHx of previous Perineal abscess a year ago presented to the ED with left sided perianal abscess.   Vitally stable, CBC 23.9>>16.4, CRP 18.85, UA normal.   BC pending, WC contaminated.  CT abdomen and pelvis  shows left-sided perianal abscess.   I&D was done in the ED by Dr. Wilson. Copious foul-smelling brown pus drained from the abscess.     # Recurrent perianal abscess  -Discontinue vancomycin and Zosyn  -Start Unasyn 3 g every 6.   -Follow-up blood culture and wound culture.   -Patient needs follow-up colonoscopy after the wound healing as an outpatient to exclude any concurrent inflammatory bowel disease (Crohn's disease).     Ludmila Zhang MD            Attending note : the patient was evaluated, the note was reviewed and up dated  Recurrent perineal abscess sp I&D  Recommendations :  Unasyn while in house, oral Augmentin x 10 days with discharge  Wound care  May need a work up for IBD at one point    Sandra Pham M.D

## 2024-04-29 NOTE — PROGRESS NOTES
04/29/24 1050   Discharge Planning   Living Arrangements Spouse/significant other;Children   Support Systems Spouse/significant other   Assistance Needed Alert and oriented x 3, Independent with ADL's, Drives, No DME used,   Type of Residence Private residence   Do you have animals or pets at home? Yes   Type of Animals or Pets 3 dogs   Who is requesting discharge planning? Provider   Home or Post Acute Services None   Patient expects to be discharged to: Home with spouse and no discharge needs identified   Does the patient need discharge transport arranged? No   Financial Resource Strain   How hard is it for you to pay for the very basics like food, housing, medical care, and heating? Not hard   Housing Stability   In the last 12 months, was there a time when you were not able to pay the mortgage or rent on time? N   In the last 12 months, how many places have you lived? 1   In the last 12 months, was there a time when you did not have a steady place to sleep or slept in a shelter (including now)? N   Transportation Needs   In the past 12 months, has lack of transportation kept you from medical appointments or from getting medications? no   In the past 12 months, has lack of transportation kept you from meetings, work, or from getting things needed for daily living? No   Patient Choice   Provider Choice list and CMS website (https://medicare.gov/care-compare#search) for post-acute Quality and Resource Measure Data were provided and reviewed with: Patient   Patient / Family choosing to utilize agency / facility established prior to hospitalization No

## 2024-04-29 NOTE — CARE PLAN
SW consulted due to pt self-pay status.  Pt discharged prior to SW seeing pt.  Verified HRS will follow-up with pt at home.

## 2024-05-01 LAB
B-LACTAMASE ORGANISM ISLT: POSITIVE
BACTERIA SPEC CULT: ABNORMAL
GRAM STN SPEC: ABNORMAL
GRAM STN SPEC: ABNORMAL

## 2024-05-01 NOTE — SIGNIFICANT EVENT
Follow Up Phone Call    Outgoing phone call    Spoke to: Sherman Marsh Relationship:self   Phone number: [phone]      Outcome: contacted patient/ family   Chief Complaint   Patient presents with    Abscess     Pt states on Friday he noticed a painful red lump on his left inner buttock. States 1 yr ago he had the same thing happen in the same spot, but had to go the OR to have it removed and had a drain placed.           Diagnosis:Not applicable    States he is feeling better. No further questions or concerns.

## 2024-05-02 ENCOUNTER — APPOINTMENT (OUTPATIENT)
Dept: WOUND CARE | Facility: HOSPITAL | Age: 39
End: 2024-05-02

## 2024-05-02 LAB
BACTERIA BLD CULT: NORMAL
BACTERIA BLD CULT: NORMAL

## 2024-05-17 ENCOUNTER — APPOINTMENT (OUTPATIENT)
Dept: SURGERY | Facility: CLINIC | Age: 39
End: 2024-05-17

## 2024-05-20 ENCOUNTER — TELEPHONE (OUTPATIENT)
Dept: SURGERY | Facility: CLINIC | Age: 39
End: 2024-05-20

## 2024-05-20 NOTE — TELEPHONE ENCOUNTER
Thank you for speaking with me earlier today & confirming your scheduled visit with Dr. Wilson on 5/23/24 8:45 AM  at [facility] [address] ([address]) [place] [address] (inside [facility], main floor in the [facility]).   Parking is available at a cost of $5.00 at the Main Entrance.  We look forward to seeing you!  Aster Keller, [facility] Nurse.

## 2024-05-23 ENCOUNTER — OFFICE VISIT (OUTPATIENT)
Dept: SURGERY | Facility: CLINIC | Age: 39
End: 2024-05-23

## 2024-05-23 VITALS
HEART RATE: 81 BPM | HEIGHT: 71 IN | WEIGHT: 284.5 LBS | DIASTOLIC BLOOD PRESSURE: 80 MMHG | BODY MASS INDEX: 39.83 KG/M2 | OXYGEN SATURATION: 97 % | SYSTOLIC BLOOD PRESSURE: 119 MMHG

## 2024-05-23 DIAGNOSIS — L02.215 PERINEAL ABSCESS: Primary | ICD-10-CM

## 2024-05-23 PROCEDURE — 99024 POSTOP FOLLOW-UP VISIT: CPT | Performed by: SURGERY

## 2024-05-23 RX ORDER — SOD SULF/POT CHLORIDE/MAG SULF 1.479 G
12 TABLET ORAL EVERY 12 HOURS
Qty: 24 TABLET | Refills: 0 | Status: SHIPPED | OUTPATIENT
Start: 2024-05-23

## 2024-05-23 ASSESSMENT — PAIN SCALES - GENERAL: PAINLEVEL: 0-NO PAIN

## 2024-05-23 NOTE — PROGRESS NOTES
GENERAL SURGERY CLINIC  FOLLOW UP NOTE    Index Surgery  Date of Surgery: 4/28/24  Surgeon Attending: Larry Wilson MD  Surgical Procedure: Left recurrent perineal abscess I&D    Time Since Surgery: 1month(s)  Extra Procedures: None    COMPLICATIONS DURING ADMISSION: None    HPI: patient states I and D site is fully healed. He has no further pain or drainage.     Denies family hx of UC or Crohns.        HISTORY:  Fever/Chills: Denies  Abdominal Pain: denies  Back Pain: Denies  Increased Heart Rate: Denies  Bloating / Hiccups: Denies  Shortness of Breath: Denies  Cough / Wheezing: Denies  Calf/Thigh pain or swelling: Denies  Decreased Urine Output: Denies  Nausea/Vomiting: denies  Diarrhea: Denies  Bowel function: normal    Current Diet: regular    Current Medications:   No current outpatient medications on file.     No current facility-administered medications for this visit.       REVIEW OF SYSTEMS:  CONSTITUTIONAL: Patient denies fevers, chills, sweats and weight changes.  EYES: Patient denies any visual symptoms.  EARS, NOSE, AND THROAT: No difficulties with hearing. No symptoms of rhinitis or sore throat.  CARDIOVASCULAR: Patient denies chest pains, palpitations, orthopnea and paroxysmal nocturnal dyspnea.  RESPIRATORY: No dyspnea on exertion, no wheezing or cough.  GI: No nausea, vomiting, diarrhea, constipation, abdominal pain, hematochezia or melena.  : No urinary hesitancy or dribbling. No nocturia or urinary frequency. No abnormal urethral discharge.  MUSCULOSKELETAL: No myalgias or arthralgias.  NEUROLOGIC: No chronic headaches, no seizures. Patient denies numbness, tingling or weakness.  PSYCHIATRIC: Patient denies problems with mood disturbance. No problems with anxiety.  ENDOCRINE: No excessive urination or excessive thirst.  DERMATOLOGIC: Patient denies any rashes or skin changes.    PHYSICAL EXAM:  PHYSICAL EXAMINATION:  Visit Vitals  Visit Vitals  /80 (BP Location: Right arm, Patient  "Position: Sitting, BP Cuff Size: Large adult)   Pulse 81   Ht 1.803 m (5' 11\")   Wt 129 kg (284 lb 8 oz)   SpO2 97%   BMI 39.68 kg/m²   Smoking Status Every Day   BSA 2.54 m²     GENERAL: No apparent distress. Pt is alert and oriented x3.  VITAL SIGNS: HR, BP, Temp; Normal  HEENT: Head is normocephalic and atraumatic. Extraocular muscles are intact. Pupils are equal, round, and reactive to light and accommodation. Nares appeared normal. Mouth is well hydrated and without lesions. Mucous membranes are moist. Posterior pharynx clear of any exudate or lesions.  NECK: Supple. No carotid bruits. No lymphadenopathy or thyromegaly.  LUNGS: Clear to auscultation.  HEART: Regular rate and rhythm without murmur.  ABDOMEN: Soft, non-tender to palpation, and nondistended. Positive bowel sounds. No hepatosplenomegaly was noted.   EXTREMITIES: Without any cyanosis, clubbing, rash, lesions or edema.  NEUROLOGIC: Cranial nerves II through XII are grossly intact.  PSYCHIATRIC: Flat affect, but denies suicidal or homicidal ideations.  SKIN: well healed left incision, no erythema, induration or tenderness      IMPRESSION: Normal post-OP course    PLAN:  Recommend a colonoscopy to rule out Crohn's disease.     Larry Wilson MD  Bariatric and Minimally Invasive General Surgery    "

## 2024-10-16 ENCOUNTER — HOSPITAL ENCOUNTER (EMERGENCY)
Facility: HOSPITAL | Age: 39
Discharge: HOME | End: 2024-10-16

## 2024-10-16 VITALS
DIASTOLIC BLOOD PRESSURE: 89 MMHG | RESPIRATION RATE: 20 BRPM | HEART RATE: 125 BPM | TEMPERATURE: 99 F | SYSTOLIC BLOOD PRESSURE: 147 MMHG | WEIGHT: 260 LBS | OXYGEN SATURATION: 96 % | BODY MASS INDEX: 36.4 KG/M2 | HEIGHT: 71 IN

## 2024-10-16 DIAGNOSIS — L03.317 CELLULITIS OF BUTTOCK: Primary | ICD-10-CM

## 2024-10-16 PROCEDURE — 2500000001 HC RX 250 WO HCPCS SELF ADMINISTERED DRUGS (ALT 637 FOR MEDICARE OP): Performed by: PHYSICIAN ASSISTANT

## 2024-10-16 PROCEDURE — 99283 EMERGENCY DEPT VISIT LOW MDM: CPT

## 2024-10-16 RX ORDER — DOXYCYCLINE HYCLATE 100 MG
100 TABLET ORAL ONCE
Status: COMPLETED | OUTPATIENT
Start: 2024-10-16 | End: 2024-10-16

## 2024-10-16 RX ORDER — DOXYCYCLINE 100 MG/1
100 CAPSULE ORAL 2 TIMES DAILY
Qty: 20 CAPSULE | Refills: 0 | Status: SHIPPED | OUTPATIENT
Start: 2024-10-16 | End: 2024-10-26

## 2024-10-16 ASSESSMENT — COLUMBIA-SUICIDE SEVERITY RATING SCALE - C-SSRS
6. HAVE YOU EVER DONE ANYTHING, STARTED TO DO ANYTHING, OR PREPARED TO DO ANYTHING TO END YOUR LIFE?: NO
1. IN THE PAST MONTH, HAVE YOU WISHED YOU WERE DEAD OR WISHED YOU COULD GO TO SLEEP AND NOT WAKE UP?: NO
2. HAVE YOU ACTUALLY HAD ANY THOUGHTS OF KILLING YOURSELF?: NO

## 2024-10-16 ASSESSMENT — LIFESTYLE VARIABLES
HAVE YOU EVER FELT YOU SHOULD CUT DOWN ON YOUR DRINKING: NO
HAVE PEOPLE ANNOYED YOU BY CRITICIZING YOUR DRINKING: NO
EVER FELT BAD OR GUILTY ABOUT YOUR DRINKING: NO
EVER HAD A DRINK FIRST THING IN THE MORNING TO STEADY YOUR NERVES TO GET RID OF A HANGOVER: NO
TOTAL SCORE: 0

## 2024-10-16 ASSESSMENT — PAIN SCALES - GENERAL: PAINLEVEL_OUTOF10: 4

## 2024-10-16 ASSESSMENT — PAIN - FUNCTIONAL ASSESSMENT: PAIN_FUNCTIONAL_ASSESSMENT: 0-10

## 2024-10-16 ASSESSMENT — PAIN DESCRIPTION - LOCATION: LOCATION: GROIN

## 2024-10-16 ASSESSMENT — PAIN DESCRIPTION - DESCRIPTORS: DESCRIPTORS: ACHING

## 2024-10-16 ASSESSMENT — PAIN DESCRIPTION - ORIENTATION: ORIENTATION: LEFT

## 2024-10-16 ASSESSMENT — PAIN DESCRIPTION - PAIN TYPE: TYPE: ACUTE PAIN

## 2024-10-16 NOTE — ED TRIAGE NOTES
Patient c/o of an abscess on his left groin that he noticed this morning, patient recently had an abscess in the same spot about 6 months. Patient denies fevers or chills at home.

## 2024-10-16 NOTE — ED PROVIDER NOTES
HPI   Chief Complaint   Patient presents with    Abscess       Is a 39-year-old male coming in for left-sided gluteal abscess or cellulitis.  He states that for the last 2 days he has had discomfort to the area.  He has had infections to this area before in the past and is seeing Dr. Salazar.  She told him that he potentially had Crohn's and he is supposed to get a colonoscopy.  He states he did not get this done yet.  Patient does report a history of sepsis as well.  He has not had fevers or chills at home.  He denies any vomiting.  No diarrhea.  Is located left gluteal fold.  He reports no other medical history such as diabetes or immunocompromise states.      History provided by:  Patient          Patient History   History reviewed. No pertinent past medical history.  Past Surgical History:   Procedure Laterality Date    ABCESS DRAINAGE  04/28/2024    PERINEAL CHRONIC LEFT GROIN I&D DR. SALAZAR @ ED [facility]     No family history on file.  Social History     Tobacco Use    Smoking status: Every Day     Current packs/day: 0.50     Average packs/day: 0.5 packs/day for 17.8 years (8.9 ttl pk-yrs)     Types: Cigarettes     Start date: 1/1/2007     Passive exposure: Past (5/20/24:  PT VERBALIZES HX SMOKE EXPOSURE GROWING UP [facility])    Smokeless tobacco: Never    Tobacco comments:     5/20/24: PT VERBALLY VERIFIES 10 CIGGS DAILY'ITZ [facility]   Vaping Use    Vaping status: Every Day    Substances: Nicotine   Substance Use Topics    Alcohol use: Yes     Comment: 5/20/24: PT VERBALLY VERIFIES SOCIALY (ONCE A YEAR) [facility]    Drug use: Never     Comment: 5/20/24:  PT VERBALLY VERIFIES [facility]       Physical Exam   ED Triage Vitals [10/16/24 1632]   Temperature Heart Rate Respirations BP   37.2 °C (99 °F) (!) 125 20 147/89      Pulse Ox Temp Source Heart Rate Source Patient Position   96 % Temporal Monitor --      BP Location FiO2 (%)     -- --       Physical Exam  Vitals and nursing note reviewed.   Constitutional:       General:  He is not in acute distress.     Appearance: Normal appearance. He is not toxic-appearing.   HENT:      Head: Normocephalic and atraumatic.      Nose: Nose normal.      Mouth/Throat:      Mouth: Mucous membranes are moist.      Pharynx: Oropharynx is clear.   Eyes:      Extraocular Movements: Extraocular movements intact.      Conjunctiva/sclera: Conjunctivae normal.      Pupils: Pupils are equal, round, and reactive to light.   Cardiovascular:      Rate and Rhythm: Regular rhythm. Tachycardia present.      Pulses: Normal pulses.      Heart sounds: Normal heart sounds.   Pulmonary:      Effort: Pulmonary effort is normal. No respiratory distress.      Breath sounds: Normal breath sounds.   Abdominal:      General: Abdomen is flat. Bowel sounds are normal.      Palpations: Abdomen is soft.      Tenderness: There is no abdominal tenderness.   Musculoskeletal:         General: Normal range of motion.      Cervical back: Normal range of motion and neck supple.   Skin:     General: Skin is warm and dry.      Coloration: Skin is not jaundiced or pale.      Findings: No bruising.             Comments: Patient has erythema and warmth to the left gluteal fold in the medial leg.  There is no fluctuance or hardened area for possible abscess.  No crepitus felt.   Neurological:      General: No focal deficit present.      Mental Status: He is alert and oriented to person, place, and time. Mental status is at baseline.   Psychiatric:         Mood and Affect: Mood normal.         Behavior: Behavior normal.           ED Course & MDM   Diagnoses as of 10/16/24 1649   Cellulitis of buttock                 No data recorded     [place] Coma Scale Score: 15 (10/16/24 1633 : Frederick Estevez RN)                           Medical Decision Making  Summary:  Medical Decision Making:   Patient presented as described in HPI. Patient case including ROS, PE, and treatment and plan discussed with ED attending if attached as cosigner. Results from  "labs and or imaging included below if completed. Sherman Marsh  is a 39 y.o. coming in for Patient presents with:  Abscess  .  Patient has erythema and warmth to the left gluteal fold.  There is no obvious or felt abscess or collection of fluid.  Patient's heart rate is elevated at 125.  I advised that would like to do lab work as well as potential imaging on the patient.  He states that he was supposed to follow-up with Dr. Wilson but he wanted to come in and \"catch it early\" he states that his heart rate is elevated because he is anxious and nervous.  He reports that it is always elevated.  Shared decision making is completed with the patient.  I advised that preferentially I would like to do some lab work as well as potential imaging to evaluate the patient for an infection.  I would start the patient on antibiotics as well.  He states he prefers to see Dr. Wilson as well as do outpatient antibiotics but also agrees for strict immediate return precautions that if he does develop any fevers or chills or worsening pain.  Patient prefers not to have the lab work and imaging at this time despite understanding the risks of not having a complete evaluation and assessment with labs and imaging.  He will be discharged with doxycycline and first dose will be given here.      Disposition is completed with shared decision making with the patient or guardian present with the patient. They were advised to follow up with PCP or recommended provider in 2-3 days for another evaluation and exam. I advised the patient to return or go to closest emergency room immediately if symptoms change, get worse, or new symptoms develop prior to follow up. I explained the plan and treatment course. Patient/guardian is in agreement with plan, treatment course, and follow up and state that they will comply.    Labs Reviewed - No data to display   No orders to display                         Tests/Medications/Escalations of Care considered but " not given: As in MDM    Patient care discussed with: N/A  Social Determinants affecting care: N/A    Final diagnosis and disposition as documented     Diagnoses as of 10/16/24 1702  Cellulitis of buttock       Shared decision making was completed and determined that patient will be discharged. I discussed the differential; results and discharge plan with the patient and/or family/friend/caregiver if present.  I emphasized the importance of follow-up with the physician I referred them to in the timeframe recommended.  I explained reasons for the patient to return to the Emergency Department. They agreed that if they feel their condition is worsening or if they have any other concern they should call 911 immediately for further assistance. I gave the patient an opportunity to ask all questions they had and answered all of them accordingly. They understand return precautions and discharge instructions. The patient and/or family/friend/caregiver expressed understanding verbally and that they would comply.     Disposition: Discharge      This note has been transcribed using voice recognition and may contain grammatical errors, misplaced words, incorrect words, incorrect phrases or other errors.         Procedure  Procedures     Regan Douglas PA-C  10/16/24 1709

## 2024-10-18 ENCOUNTER — APPOINTMENT (OUTPATIENT)
Dept: RADIOLOGY | Facility: HOSPITAL | Age: 39
End: 2024-10-18

## 2024-10-18 ENCOUNTER — ANESTHESIA (OUTPATIENT)
Dept: OPERATING ROOM | Facility: HOSPITAL | Age: 39
End: 2024-10-18

## 2024-10-18 ENCOUNTER — HOSPITAL ENCOUNTER (EMERGENCY)
Facility: HOSPITAL | Age: 39
Discharge: HOME | End: 2024-10-18
Attending: EMERGENCY MEDICINE

## 2024-10-18 ENCOUNTER — ANESTHESIA EVENT (OUTPATIENT)
Dept: OPERATING ROOM | Facility: HOSPITAL | Age: 39
End: 2024-10-18

## 2024-10-18 VITALS
BODY MASS INDEX: 36.4 KG/M2 | WEIGHT: 260 LBS | SYSTOLIC BLOOD PRESSURE: 130 MMHG | RESPIRATION RATE: 20 BRPM | HEIGHT: 71 IN | TEMPERATURE: 99.5 F | DIASTOLIC BLOOD PRESSURE: 64 MMHG | HEART RATE: 101 BPM | OXYGEN SATURATION: 94 %

## 2024-10-18 DIAGNOSIS — L02.91 ABSCESS: ICD-10-CM

## 2024-10-18 DIAGNOSIS — L02.215 PERINEAL ABSCESS: Primary | ICD-10-CM

## 2024-10-18 DIAGNOSIS — L03.317 CELLULITIS OF BUTTOCK: ICD-10-CM

## 2024-10-18 LAB
ANION GAP SERPL CALC-SCNC: 14 MMOL/L (ref 10–20)
BASOPHILS # BLD AUTO: 0.07 X10*3/UL (ref 0–0.1)
BASOPHILS NFR BLD AUTO: 0.3 %
BUN SERPL-MCNC: 14 MG/DL (ref 6–23)
CALCIUM SERPL-MCNC: 8.7 MG/DL (ref 8.6–10.3)
CHLORIDE SERPL-SCNC: 101 MMOL/L (ref 98–107)
CO2 SERPL-SCNC: 22 MMOL/L (ref 21–32)
CREAT SERPL-MCNC: 0.93 MG/DL (ref 0.5–1.3)
EGFRCR SERPLBLD CKD-EPI 2021: >90 ML/MIN/1.73M*2
EOSINOPHIL # BLD AUTO: 0.03 X10*3/UL (ref 0–0.7)
EOSINOPHIL NFR BLD AUTO: 0.1 %
ERYTHROCYTE [DISTWIDTH] IN BLOOD BY AUTOMATED COUNT: 12.2 % (ref 11.5–14.5)
GLUCOSE SERPL-MCNC: 154 MG/DL (ref 74–99)
HCT VFR BLD AUTO: 41.1 % (ref 41–52)
HGB BLD-MCNC: 13.8 G/DL (ref 13.5–17.5)
IMM GRANULOCYTES # BLD AUTO: 0.14 X10*3/UL (ref 0–0.7)
IMM GRANULOCYTES NFR BLD AUTO: 0.7 % (ref 0–0.9)
LYMPHOCYTES # BLD AUTO: 1.73 X10*3/UL (ref 1.2–4.8)
LYMPHOCYTES NFR BLD AUTO: 8.4 %
MAGNESIUM SERPL-MCNC: 1.81 MG/DL (ref 1.6–2.4)
MCH RBC QN AUTO: 29.6 PG (ref 26–34)
MCHC RBC AUTO-ENTMCNC: 33.6 G/DL (ref 32–36)
MCV RBC AUTO: 88 FL (ref 80–100)
MONOCYTES # BLD AUTO: 1.43 X10*3/UL (ref 0.1–1)
MONOCYTES NFR BLD AUTO: 6.9 %
NEUTROPHILS # BLD AUTO: 17.28 X10*3/UL (ref 1.2–7.7)
NEUTROPHILS NFR BLD AUTO: 83.6 %
NRBC BLD-RTO: 0 /100 WBCS (ref 0–0)
PLATELET # BLD AUTO: 254 X10*3/UL (ref 150–450)
POTASSIUM SERPL-SCNC: 3.3 MMOL/L (ref 3.5–5.3)
RBC # BLD AUTO: 4.67 X10*6/UL (ref 4.5–5.9)
SODIUM SERPL-SCNC: 134 MMOL/L (ref 136–145)
WBC # BLD AUTO: 20.7 X10*3/UL (ref 4.4–11.3)

## 2024-10-18 PROCEDURE — 2500000005 HC RX 250 GENERAL PHARMACY W/O HCPCS: Performed by: EMERGENCY MEDICINE

## 2024-10-18 PROCEDURE — 96366 THER/PROPH/DIAG IV INF ADDON: CPT

## 2024-10-18 PROCEDURE — 7100000001 HC RECOVERY ROOM TIME - INITIAL BASE CHARGE: Performed by: SURGERY

## 2024-10-18 PROCEDURE — 2500000004 HC RX 250 GENERAL PHARMACY W/ HCPCS (ALT 636 FOR OP/ED): Performed by: SURGERY

## 2024-10-18 PROCEDURE — 46040 I&D ISCHIORCT&/PERIRCT ABSC: CPT | Performed by: SURGERY

## 2024-10-18 PROCEDURE — 99285 EMERGENCY DEPT VISIT HI MDM: CPT | Mod: 25

## 2024-10-18 PROCEDURE — 87070 CULTURE OTHR SPECIMN AEROBIC: CPT | Mod: GEALAB | Performed by: SURGERY

## 2024-10-18 PROCEDURE — 72193 CT PELVIS W/DYE: CPT | Performed by: RADIOLOGY

## 2024-10-18 PROCEDURE — 2500000002 HC RX 250 W HCPCS SELF ADMINISTERED DRUGS (ALT 637 FOR MEDICARE OP, ALT 636 FOR OP/ED): Performed by: NURSE ANESTHETIST, CERTIFIED REGISTERED

## 2024-10-18 PROCEDURE — 2720000007 HC OR 272 NO HCPCS: Performed by: SURGERY

## 2024-10-18 PROCEDURE — 99221 1ST HOSP IP/OBS SF/LOW 40: CPT | Performed by: SURGERY

## 2024-10-18 PROCEDURE — 7100000010 HC PHASE TWO TIME - EACH INCREMENTAL 1 MINUTE: Performed by: SURGERY

## 2024-10-18 PROCEDURE — 2500000004 HC RX 250 GENERAL PHARMACY W/ HCPCS (ALT 636 FOR OP/ED): Performed by: EMERGENCY MEDICINE

## 2024-10-18 PROCEDURE — 3600000003 HC OR TIME - INITIAL BASE CHARGE - PROCEDURE LEVEL THREE: Performed by: SURGERY

## 2024-10-18 PROCEDURE — 85025 COMPLETE CBC W/AUTO DIFF WBC: CPT | Performed by: EMERGENCY MEDICINE

## 2024-10-18 PROCEDURE — 83735 ASSAY OF MAGNESIUM: CPT | Performed by: EMERGENCY MEDICINE

## 2024-10-18 PROCEDURE — 2500000004 HC RX 250 GENERAL PHARMACY W/ HCPCS (ALT 636 FOR OP/ED): Performed by: NURSE ANESTHETIST, CERTIFIED REGISTERED

## 2024-10-18 PROCEDURE — 36415 COLL VENOUS BLD VENIPUNCTURE: CPT | Performed by: EMERGENCY MEDICINE

## 2024-10-18 PROCEDURE — 2550000001 HC RX 255 CONTRASTS: Performed by: EMERGENCY MEDICINE

## 2024-10-18 PROCEDURE — 80048 BASIC METABOLIC PNL TOTAL CA: CPT | Performed by: EMERGENCY MEDICINE

## 2024-10-18 PROCEDURE — 7100000002 HC RECOVERY ROOM TIME - EACH INCREMENTAL 1 MINUTE: Performed by: SURGERY

## 2024-10-18 PROCEDURE — 96365 THER/PROPH/DIAG IV INF INIT: CPT | Mod: 59

## 2024-10-18 PROCEDURE — 3700000002 HC GENERAL ANESTHESIA TIME - EACH INCREMENTAL 1 MINUTE: Performed by: SURGERY

## 2024-10-18 PROCEDURE — 96375 TX/PRO/DX INJ NEW DRUG ADDON: CPT | Mod: 59

## 2024-10-18 PROCEDURE — A9999 DME SUPPLY OR ACCESSORY, NOS: HCPCS | Performed by: SURGERY

## 2024-10-18 PROCEDURE — 7100000009 HC PHASE TWO TIME - INITIAL BASE CHARGE: Performed by: SURGERY

## 2024-10-18 PROCEDURE — 3700000001 HC GENERAL ANESTHESIA TIME - INITIAL BASE CHARGE: Performed by: SURGERY

## 2024-10-18 PROCEDURE — 3600000008 HC OR TIME - EACH INCREMENTAL 1 MINUTE - PROCEDURE LEVEL THREE: Performed by: SURGERY

## 2024-10-18 PROCEDURE — 72193 CT PELVIS W/DYE: CPT

## 2024-10-18 PROCEDURE — 46040 I&D ISCHIORCT&/PERIRCT ABSC: CPT

## 2024-10-18 PROCEDURE — 2500000005 HC RX 250 GENERAL PHARMACY W/O HCPCS: Performed by: SURGERY

## 2024-10-18 PROCEDURE — 2500000002 HC RX 250 W HCPCS SELF ADMINISTERED DRUGS (ALT 637 FOR MEDICARE OP, ALT 636 FOR OP/ED): Performed by: EMERGENCY MEDICINE

## 2024-10-18 RX ORDER — ROCURONIUM BROMIDE 10 MG/ML
INJECTION, SOLUTION INTRAVENOUS AS NEEDED
Status: DISCONTINUED | OUTPATIENT
Start: 2024-10-18 | End: 2024-10-18

## 2024-10-18 RX ORDER — LIDOCAINE HYDROCHLORIDE 10 MG/ML
INJECTION, SOLUTION INFILTRATION; PERINEURAL AS NEEDED
Status: DISCONTINUED | OUTPATIENT
Start: 2024-10-18 | End: 2024-10-18 | Stop reason: HOSPADM

## 2024-10-18 RX ORDER — IPRATROPIUM BROMIDE AND ALBUTEROL SULFATE 2.5; .5 MG/3ML; MG/3ML
SOLUTION RESPIRATORY (INHALATION) AS NEEDED
Status: DISCONTINUED | OUTPATIENT
Start: 2024-10-18 | End: 2024-10-18

## 2024-10-18 RX ORDER — ONDANSETRON HYDROCHLORIDE 2 MG/ML
INJECTION, SOLUTION INTRAVENOUS AS NEEDED
Status: DISCONTINUED | OUTPATIENT
Start: 2024-10-18 | End: 2024-10-18

## 2024-10-18 RX ORDER — ALBUTEROL SULFATE 0.83 MG/ML
2.5 SOLUTION RESPIRATORY (INHALATION) ONCE AS NEEDED
Status: DISCONTINUED | OUTPATIENT
Start: 2024-10-18 | End: 2024-10-18 | Stop reason: HOSPADM

## 2024-10-18 RX ORDER — LIDOCAINE HYDROCHLORIDE 10 MG/ML
INJECTION, SOLUTION EPIDURAL; INFILTRATION; INTRACAUDAL; PERINEURAL AS NEEDED
Status: DISCONTINUED | OUTPATIENT
Start: 2024-10-18 | End: 2024-10-18

## 2024-10-18 RX ORDER — KETOROLAC TROMETHAMINE 30 MG/ML
30 INJECTION, SOLUTION INTRAMUSCULAR; INTRAVENOUS ONCE
Status: COMPLETED | OUTPATIENT
Start: 2024-10-18 | End: 2024-10-18

## 2024-10-18 RX ORDER — ACETAMINOPHEN 325 MG/1
650 TABLET ORAL EVERY 4 HOURS PRN
Status: DISCONTINUED | OUTPATIENT
Start: 2024-10-18 | End: 2024-10-18 | Stop reason: HOSPADM

## 2024-10-18 RX ORDER — SODIUM CHLORIDE, SODIUM LACTATE, POTASSIUM CHLORIDE, CALCIUM CHLORIDE 600; 310; 30; 20 MG/100ML; MG/100ML; MG/100ML; MG/100ML
100 INJECTION, SOLUTION INTRAVENOUS CONTINUOUS
Status: ACTIVE | OUTPATIENT
Start: 2024-10-18 | End: 2024-10-18

## 2024-10-18 RX ORDER — SUCCINYLCHOLINE CHLORIDE 20 MG/ML
INJECTION INTRAMUSCULAR; INTRAVENOUS AS NEEDED
Status: DISCONTINUED | OUTPATIENT
Start: 2024-10-18 | End: 2024-10-18

## 2024-10-18 RX ORDER — ONDANSETRON HYDROCHLORIDE 2 MG/ML
8 INJECTION, SOLUTION INTRAVENOUS ONCE
Status: DISCONTINUED | OUTPATIENT
Start: 2024-10-18 | End: 2024-10-18 | Stop reason: HOSPADM

## 2024-10-18 RX ORDER — SODIUM CHLORIDE, SODIUM LACTATE, POTASSIUM CHLORIDE, CALCIUM CHLORIDE 600; 310; 30; 20 MG/100ML; MG/100ML; MG/100ML; MG/100ML
INJECTION, SOLUTION INTRAVENOUS CONTINUOUS PRN
Status: DISCONTINUED | OUTPATIENT
Start: 2024-10-18 | End: 2024-10-18

## 2024-10-18 RX ORDER — FENTANYL CITRATE 50 UG/ML
INJECTION, SOLUTION INTRAMUSCULAR; INTRAVENOUS AS NEEDED
Status: DISCONTINUED | OUTPATIENT
Start: 2024-10-18 | End: 2024-10-18

## 2024-10-18 RX ORDER — MIDAZOLAM HYDROCHLORIDE 1 MG/ML
INJECTION INTRAMUSCULAR; INTRAVENOUS AS NEEDED
Status: DISCONTINUED | OUTPATIENT
Start: 2024-10-18 | End: 2024-10-18

## 2024-10-18 RX ORDER — POTASSIUM CHLORIDE 20 MEQ/1
60 TABLET, EXTENDED RELEASE ORAL ONCE
Status: COMPLETED | OUTPATIENT
Start: 2024-10-18 | End: 2024-10-18

## 2024-10-18 RX ORDER — BUPIVACAINE HYDROCHLORIDE 5 MG/ML
INJECTION, SOLUTION PERINEURAL AS NEEDED
Status: DISCONTINUED | OUTPATIENT
Start: 2024-10-18 | End: 2024-10-18 | Stop reason: HOSPADM

## 2024-10-18 RX ORDER — LIDOCAINE HYDROCHLORIDE AND EPINEPHRINE 10; 10 MG/ML; UG/ML
20 INJECTION, SOLUTION INFILTRATION; PERINEURAL ONCE
Status: DISCONTINUED | OUTPATIENT
Start: 2024-10-18 | End: 2024-10-18 | Stop reason: HOSPADM

## 2024-10-18 RX ORDER — KETOROLAC TROMETHAMINE 30 MG/ML
INJECTION, SOLUTION INTRAMUSCULAR; INTRAVENOUS AS NEEDED
Status: DISCONTINUED | OUTPATIENT
Start: 2024-10-18 | End: 2024-10-18

## 2024-10-18 RX ORDER — PROPOFOL 10 MG/ML
INJECTION, EMULSION INTRAVENOUS AS NEEDED
Status: DISCONTINUED | OUTPATIENT
Start: 2024-10-18 | End: 2024-10-18

## 2024-10-18 RX ORDER — SODIUM CHLORIDE 0.9 G/100ML
IRRIGANT IRRIGATION AS NEEDED
Status: DISCONTINUED | OUTPATIENT
Start: 2024-10-18 | End: 2024-10-18 | Stop reason: HOSPADM

## 2024-10-18 RX ADMIN — POTASSIUM CHLORIDE 60 MEQ: 1500 TABLET, EXTENDED RELEASE ORAL at 10:09

## 2024-10-18 RX ADMIN — VANCOMYCIN HYDROCHLORIDE 1500 MG: 1.5 INJECTION, POWDER, LYOPHILIZED, FOR SOLUTION INTRAVENOUS at 08:58

## 2024-10-18 RX ADMIN — KETOROLAC TROMETHAMINE 30 MG: 30 INJECTION, SOLUTION INTRAMUSCULAR at 08:58

## 2024-10-18 RX ADMIN — IOHEXOL 75 ML: 350 INJECTION, SOLUTION INTRAVENOUS at 11:17

## 2024-10-18 SDOH — HEALTH STABILITY: MENTAL HEALTH: CURRENT SMOKER: 1

## 2024-10-18 ASSESSMENT — PAIN SCALES - GENERAL
PAINLEVEL_OUTOF10: 0 - NO PAIN
PAINLEVEL_OUTOF10: 5 - MODERATE PAIN
PAINLEVEL_OUTOF10: 0 - NO PAIN
PAINLEVEL_OUTOF10: 0 - NO PAIN
PAINLEVEL_OUTOF10: 8
PAINLEVEL_OUTOF10: 0 - NO PAIN
PAINLEVEL_OUTOF10: 0 - NO PAIN
PAINLEVEL_OUTOF10: 3
PAINLEVEL_OUTOF10: 0 - NO PAIN
PAINLEVEL_OUTOF10: 0 - NO PAIN
PAIN_LEVEL: 2

## 2024-10-18 ASSESSMENT — PAIN - FUNCTIONAL ASSESSMENT
PAIN_FUNCTIONAL_ASSESSMENT: 0-10

## 2024-10-18 ASSESSMENT — LIFESTYLE VARIABLES
TOTAL SCORE: 0
EVER FELT BAD OR GUILTY ABOUT YOUR DRINKING: NO
EVER HAD A DRINK FIRST THING IN THE MORNING TO STEADY YOUR NERVES TO GET RID OF A HANGOVER: NO
HAVE PEOPLE ANNOYED YOU BY CRITICIZING YOUR DRINKING: NO
HAVE YOU EVER FELT YOU SHOULD CUT DOWN ON YOUR DRINKING: NO

## 2024-10-18 ASSESSMENT — COLUMBIA-SUICIDE SEVERITY RATING SCALE - C-SSRS
6. HAVE YOU EVER DONE ANYTHING, STARTED TO DO ANYTHING, OR PREPARED TO DO ANYTHING TO END YOUR LIFE?: NO
6. HAVE YOU EVER DONE ANYTHING, STARTED TO DO ANYTHING, OR PREPARED TO DO ANYTHING TO END YOUR LIFE?: NO
2. HAVE YOU ACTUALLY HAD ANY THOUGHTS OF KILLING YOURSELF?: NO
1. IN THE PAST MONTH, HAVE YOU WISHED YOU WERE DEAD OR WISHED YOU COULD GO TO SLEEP AND NOT WAKE UP?: NO
1. IN THE PAST MONTH, HAVE YOU WISHED YOU WERE DEAD OR WISHED YOU COULD GO TO SLEEP AND NOT WAKE UP?: NO
2. HAVE YOU ACTUALLY HAD ANY THOUGHTS OF KILLING YOURSELF?: NO

## 2024-10-18 ASSESSMENT — PAIN DESCRIPTION - PAIN TYPE
TYPE: ACUTE PAIN
TYPE: ACUTE PAIN

## 2024-10-18 ASSESSMENT — PAIN DESCRIPTION - FREQUENCY: FREQUENCY: CONSTANT/CONTINUOUS

## 2024-10-18 ASSESSMENT — PAIN DESCRIPTION - DESCRIPTORS: DESCRIPTORS: BURNING

## 2024-10-18 ASSESSMENT — PAIN DESCRIPTION - LOCATION: LOCATION: BUTTOCKS

## 2024-10-18 ASSESSMENT — PAIN DESCRIPTION - ORIENTATION: ORIENTATION: UPPER;POSTERIOR

## 2024-10-18 NOTE — OP NOTE
Incision and Drainage Anus/Rectum (L) Operative Note     Date: 10/18/2024  OR Location: [facility] OR    Name: Sherman aMrsh, : 1985, Age: 39 y.o., MRN: [id number], Sex: male    Diagnosis  Pre-op Diagnosis      * Perineal abscess [L02.215] left buttock abscess  Post-op Diagnosis     * Perineal abscess [L02.215]  Left perirectal abscess      Procedures  Incision and drainage of left perirectal/buttock/perineal abscess     Surgeons      * Phillip Mora - Primary    Resident/Fellow/Other Assistant:  Surgeons and Role:  * No surgeons found with a matching role *    Procedure Summary  Anesthesia: General  ASA: II  Anesthesia Staff: Anesthesiologist: Edmund Adair MD  CRNA: MARILOU Dominguez-CRNA  Estimated Blood Loss: 5mL  Intra-op Medications:   Administrations occurring from 1655 to 1805 on 10/18/24:   Medication Name Total Dose   BUPivacaine HCl (Marcaine) 0.5 % (5 mg/mL) injection 5 mL   lidocaine (Xylocaine) 10 mg/mL (1 %) injection 5 mL   sodium chloride 0.9 % irrigation solution 1,000 mL   dexAMETHasone (Decadron) injection 4 mg/mL 8 mg   fentaNYL (Sublimaze) injection 50 mcg/mL 100 mcg   ipratropium-albuterol (Duo-Neb) nebulizer solution 0.5-2.5 mg/3 mL 2.5 mg   ketorolac (Toradol) injection 30 mg 30 mg   LR infusion Cannot be calculated   lidocaine PF (Xylocaine-MPF) local injection 1 % 50 mg   ondansetron (Zofran) 2 mg/mL injection 4 mg   propofol (Diprivan) injection 10 mg/mL 200 mg   rocuronium (ZeMuron) 50 mg/5 mL injection 50 mg   succinylcholine (Anectine) 20 mg/mL injection 200 mg   sugammadex (Bridion) 200 mg/2 mL injection 400 mg              Anesthesia Record               Intraprocedure I/O Totals       None           Specimen:   ID Type Source Tests Collected by Time   A : LEFT BUTTOCK CULTURE #1 Swab ABSCESS TISSUE/WOUND CULTURE/SMEAR Phillip Mora MD 10/18/2024 1724        Staff:   Scrub Person: Antonette  Circulator: Deidra        Findings: see below     Indications: Sherman Marsh is an  39 y.o. male who is having surgery for above abscess     The patient was seen in the preoperative area. The risks, benefits, complications, treatment options, non-operative alternatives, expected recovery and outcomes were discussed with the patient. The possibilities of reaction to medication, pulmonary aspiration, injury to surrounding structures, bleeding, recurrent infection, the need for additional procedures, failure to diagnose a condition, and creating a complication requiring transfusion or operation were discussed with the patient. The patient concurred with the proposed plan, giving informed consent.  The site of surgery was properly noted/marked if necessary per policy. The patient has been actively warmed in preoperative area. Preoperative antibiotics have been ordered and given within 1 hours of incision. Venous thrombosis prophylaxis have been ordered including bilateral sequential compression devices    Procedure Details:   Informed consent was obtained. The patient was getting iv abx in the ED. After MAC anesthesia, he was placed in lithotomy position. the perineal area, perianal area and buttocks were prepped and draped. A skin incision about 2cm in size was then made at the previous surgical scar. it was carried down and entered the cavity. large amount of foul smelling pus was evacuated. the cavity was about 10x6 cm in size, extending to the base of scrotum and left side of perirectal area. we then enlarged the incision and did blunt exploration with finger to make sure no residual abscess cavity was present. the cavity was irrigated. Kerlix gauze wet with betadine solution was packed into the cavity. ABD pads were then applied.          Complications:  None; patient tolerated the procedure well.    Disposition: PACU - hemodynamically stable.  Condition: stable       Attending Attestation: I was present and scrubbed for the entire procedure.    Phillip Mora  Phone Number: [phone]

## 2024-10-18 NOTE — ED PROVIDER NOTES
"Pt Name: Sherman Marsh  MRN: [id number]  Birthdate 1985  Date of evaluation: 10/18/2024  [facility] ED      CHIEF COMPLAINT    Chief Complaint   Patient presents with    Abscess     Hardened/red area to upper left posterior thigh. Was seen in this ED \"couple of days ago\", sent home with ATB and has been taking them as prescribed. States area is getting bigger and more painful       HPI  39 y.o. male presents with With left buttock area abscess.  There is hardened painful.  He would like it drained.  He was started on antibiotics 2 days ago.  No fever no shaking chills.  Drainage of the area this episode yet.    REVIEW OF SYSTEMS     Review of Systems    Pmh:  Patient Active Problem List   Diagnosis    Perineal abscess       CURRENT MEDICATIONS       Previous Medications    DOXYCYCLINE (VIBRAMYCIN) 100 MG CAPSULE    Take 1 capsule (100 mg) by mouth 2 times a day for 10 days. Take with at least 8 ounces (large glass) of water, do not lie down for 30 minutes after    SOD SULF-POT CHLORIDE-MAG SULF (SUTAB) 1.479-0.188- 0.225 GRAM TABLET    Take 12 tablets by mouth every 12 hours. Follow package instructions.       No family history on file.    Social Drivers of Health     Tobacco Use: High Risk (10/18/2024)    Patient History     Smoking Tobacco Use: Every Day     Smokeless Tobacco Use: Never     Passive Exposure: Past   Alcohol Use: Not At Risk (4/28/2024)    AUDIT-C     Frequency of Alcohol Consumption: Never     Average Number of Drinks: Patient does not drink     Frequency of Binge Drinking: Never   Financial Resource Strain: Low Risk  (4/29/2024)    Overall Financial Resource Strain (CARDIA)     Difficulty of Paying Living Expenses: Not hard at all   Food Insecurity: Not on file   Transportation Needs: No Transportation Needs (4/29/2024)    PRAPARE - Transportation     Lack of Transportation (Medical): No     Lack of Transportation (Non-Medical): No   Physical Activity: Not on file   Stress: Not on file   Social " Connections: Not on file   Intimate Partner Violence: Not on file   Depression: Not at risk (4/28/2024)    PHQ-2     PHQ-2 Score: 0   Housing Stability: Low Risk  (4/29/2024)    Housing Stability Vital Sign     Unable to Pay for Housing in the Last Year: No     Number of Places Lived in the Last Year: 1     Unstable Housing in the Last Year: No   Utilities: Not on file   Digital Equity: Not on file   Health Literacy: Not on file       Physical Exam  Vitals and nursing note reviewed.   Constitutional:       Appearance: He is obese.   Cardiovascular:      Rate and Rhythm: Regular rhythm. Tachycardia present.      Pulses:           Dorsalis pedis pulses are 2+ on the right side.        Posterior tibial pulses are 2+ on the right side.   Musculoskeletal:      Left hip: Normal range of motion.      Left knee: Normal.   Skin:     General: Skin is warm.      Coloration: Skin is not jaundiced.      Findings: Abscess and erythema present.      Comments: 8 cm area of induration in the left gluteus.  Surrounding erythema.  Swelling.   Neurological:      Mental Status: He is alert.      Sensory: Sensation is intact.      Motor: Motor function is intact.      Gait: Gait is intact.      Deep Tendon Reflexes: Reflexes are normal and symmetric.   Psychiatric:         Thought Content: Thought content normal.       Vitals:    10/18/24 1245 10/18/24 1300 10/18/24 1302 10/18/24 1315   BP:  136/75 136/75    BP Location:   Left arm    Pulse:   97    Resp:       Temp:       TempSrc:       SpO2: 98% 100% 98% 100%   Weight:       Height:             Medical Decision Making       SCREENINGS   Chela Coma Scale  Best Eye Response: Spontaneous  Best Verbal Response: Oriented  Best Motor Response: Follows commands  Chela Coma Scale Score: 15     Procedures     Imgaing:  CT pelvis w IV contrast   Final Result   Superficial collection consistent with abscess extending along the   entirety of the left inner thigh, inferior left scrotum, left    peroneal location, and left perirectal location. Adjacent   subcutaneous stranding consistent with cellulitis.        MACRO:   None        Signed by: Marcos Lowery 10/18/2024 12:25 PM   Dictation workstation:   [id number]          Labs:  Labs Reviewed   CBC WITH AUTO DIFFERENTIAL - Abnormal       Result Value    WBC 20.7 (*)     nRBC 0.0      RBC 4.67      Hemoglobin 13.8      Hematocrit 41.1      MCV 88      MCH 29.6      MCHC 33.6      RDW 12.2      Platelets 254      Neutrophils % 83.6      Immature Granulocytes %, Automated 0.7      Lymphocytes % 8.4      Monocytes % 6.9      Eosinophils % 0.1      Basophils % 0.3      Neutrophils Absolute 17.28 (*)     Immature Granulocytes Absolute, Automated 0.14      Lymphocytes Absolute 1.73      Monocytes Absolute 1.43 (*)     Eosinophils Absolute 0.03      Basophils Absolute 0.07     BASIC METABOLIC PANEL - Abnormal    Glucose 154 (*)     Sodium 134 (*)     Potassium 3.3 (*)     Chloride 101      Bicarbonate 22      Anion Gap 14      Urea Nitrogen 14      Creatinine 0.93      eGFR >90      Calcium 8.7     MAGNESIUM - Normal    Magnesium 1.81     TISSUE/WOUND CULTURE/SMEAR       EKG:  No orders to display       Medications ordered:  Medications   lactated Ringer's bolus 500 mL (0 mL intravenous Held Per Protocol 10/18/24 1006)   lidocaine-epinephrine (Xylocaine W/EPI) 1 %-1:100,000 injection 20 mL (has no administration in time range)   vancomycin (Vancocin) in dextrose 5 % water (D5W) 500 mL IV 1,500 mg (0 mg intravenous Stopped 10/18/24 1050)   ketorolac (Toradol) injection 30 mg (30 mg intravenous Given 10/18/24 0858)   potassium chloride CR (Klor-Con M20) ER tablet 60 mEq (60 mEq oral Given 10/18/24 1009)   iohexol (OMNIPaque) 350 mg iodine/mL solution 75 mL (75 mL intravenous Given 10/18/24 1117)         Orders placed during visit:  CT pelvis w IV contrast   Final Result   Superficial collection consistent with abscess extending along the   entirety of the left  inner thigh, inferior left scrotum, left   peroneal location, and left perirectal location. Adjacent   subcutaneous stranding consistent with cellulitis.        MACRO:   None        Signed by: Marcos Lowery 10/18/2024 12:25 PM   Dictation workstation:   [id number]          Diagnoses as of 10/18/24 1622   Abscess   Cellulitis of buttock       CONSULTS:  None    CRITICAL CARE TIME      Prior medical records reviewed: 4/28/2024 admitted by dr.linda wilson from surgery. Per dc summary dc 4/29/2024    Consulted Dr.Linda Wilson from surgery recommended on call surgeon  Consulted Dr. Phillip Mora from surgery.    recommended CT.   Ct done. Shows abscess. Extensive. Recontacted dr. Mora recommended medical admit.  Patient refusing medical admit.   Contacted  about refusal of medicine admit.   1617 Dr. Mora recommended operative I&D shortly.     Clinical impression:  1. Perineal abscess  Case Request Operating Room: Incision and Drainage Anus/Rectum    Case Request Operating Room: Incision and Drainage Anus/Rectum      2. Abscess        3. Cellulitis of buttock            DISPOSITION/PLAN   DISPOSITION Send To Or 10/18/2024 04:21:14 PM       I prescribed the following medications:  New Prescriptions    No medications on file       PATIENT REFERRED TO:  No follow-up provider specified.       Zurdo Johnson MD  10/18/24 9559

## 2024-10-18 NOTE — DISCHARGE SUMMARY
Discharge Diagnosis  Left buttock/perineal and perirectal abscess     Issues Requiring Follow-Up  Postop follow up wound check and dressing change     Test Results Pending At Discharge  Pending Labs       Order Current Status    Tissue/Wound Culture/Smear Collected (10/18/24 1144)            Hospital Course   Had I and D for recurrent left buttock/perineal and perirectal abscess on 10/18. Discussed with patient and advised him to stay in hospital overnight for IV abx and wound care. He adamantly wanted to go home due to family situation and needing to care for children. He understood the risk of not staying in hospital which include worsening of infection which could be fetal. He stated that he will come back if his condition is not improving or any concern. His wife was with him in PACU and understood the risks.     Pertinent Physical Exam At Time of Discharge  Awake   Comfortable   Not labored breathing   Wound dry   Home Medications     Medication List      ASK your doctor about these medications     doxycycline 100 mg capsule; Commonly known as: Vibramycin; Take 1   capsule (100 mg) by mouth 2 times a day for 10 days. Take with at least 8   ounces (large glass) of water, do not lie down for 30 minutes after   Sutab 1.479-0.188- 0.225 gram tablet; Generic drug: sod sulf-pot   chloride-mag sulf; Take 12 tablets by mouth every 12 hours. Follow package   instructions.       Outpatient Follow-Up  Future Appointments   Date Time Provider Department Center   11/1/2024 10:15 AM Larry Wilson MD [id number] James Mora MD

## 2024-10-18 NOTE — CONSULTS
"General Surgery History and Physical    Referring Provider:  No Assigned PCP Generic Provider, MD  No ref. provider found     Chief Complaint:  Chief Complaint   Patient presents with    Abscess     Hardened/red area to upper left posterior thigh. Was seen in this ED \"couple of days ago\", sent home with ATB and has been taking them as prescribed. States area is getting bigger and more painful       History of Present Illness:  Sherman Marsh is a 39 y.o. male who   History of left buttock/perineal abscess I and D (June 2023 and May 2024)  Started to have pain at the previous I and D site on Tuesday   Was seen in ED on 10/16  Was sent home with oral abx doxycycline  Pain getting worse and induration   Came to ED for eval.   CT showed:   9 x 1.8 cm collection consistent with abscess which is superficial  along the left perineum there is extension of the previously  mentioned collection superiorly along the left inner thigh.  Subcutaneous stranding consistent with cellulitis is seen along the  left gluteal region. The abscess extends along the left perirectal  location but remains superficial.  Past Medical History:  History reviewed. No pertinent past medical history.     Past Surgical History:  Past Surgical History:   Procedure Laterality Date    ABCESS DRAINAGE  04/28/2024    PERINEAL CHRONIC LEFT GROIN I&D DR. SALAZAR @ ED [facility]        Medications:  Current Outpatient Medications   Medication Instructions    doxycycline (VIBRAMYCIN) 100 mg, oral, 2 times daily, Take with at least 8 ounces (large glass) of water, do not lie down for 30 minutes after    sod sulf-pot chloride-mag sulf (Sutab) 1.479-0.188- 0.225 gram tablet 12 tablets, oral, Every 12 hours, Follow package instructions.        Allergies:  No Known Allergies     Family History:  No family history on file.     Social History:  Social History     Socioeconomic History    Marital status:    Tobacco Use    Smoking status: Every Day     Current " packs/day: 0.50     Average packs/day: 0.5 packs/day for 17.8 years (8.9 ttl pk-yrs)     Types: Cigarettes     Start date: 1/1/2007     Passive exposure: Past (5/20/24:  PT VERBALIZES HX SMOKE EXPOSURE GROWING UP [facility] [address])    Smokeless tobacco: Never    Tobacco comments:     5/20/24: PT VERBALLY VERIFIES 10 CIGGS DAILY'ITZ [facility] [address]   Vaping Use    Vaping status: Every Day    Substances: Nicotine   Substance and Sexual Activity    Alcohol use: Not Currently     Comment: 5/20/24: PT VERBALLY VERIFIES SOCIALY (ONCE A YEAR) [facility] [address]    Drug use: Never     Comment: 5/20/24:  PT VERBALLY VERIFIES [facility] [address]     Social Drivers of Health     Financial Resource Strain: Low Risk  (4/29/2024)    Overall Financial Resource Strain (CARDIA)     Difficulty of Paying Living Expenses: Not hard at all   Transportation Needs: No Transportation Needs (4/29/2024)    PRAPARE - Transportation     Lack of Transportation (Medical): No     Lack of Transportation (Non-Medical): No   Housing Stability: Low Risk  (4/29/2024)    Housing Stability Vital Sign     Unable to Pay for Housing in the Last Year: No     Number of Places Lived in the Last Year: 1     Unstable Housing in the Last Year: No        Review of Systems:  A complete 12 point review of systems was performed and is negative except as noted in the history of present illness.      Vital Signs:  Vitals:    10/18/24 1315   BP:    Pulse:    Resp:    Temp:    SpO2: 100%      Body mass index is 36.26 kg/m².    Physical Exam:  General: No acute distress.  Neuro: Alert and oriented ×3. Follows commands.  Head: Atraumatic  Eyes: Pupils equal reactive to light. Extraocular motions intact.  Ears: Hears normal speaking voice.  Mouth, Nose, Throat: Mucous membranes moist.  Normal dentition.  Neck: Supple. No visible masses.  Breast: not examined.   Lung: Patent airway. Breathing not labored.   Vascular: Palpable radial pulses bilaterally.  Abdomen: Soft. Nondistended. Nontender.    Rectal: Not  "examined.   Genitourinary: Not examined.   Musculoskeletal: Moves all extremities.  Normal range of motion.  Extremities: No edema or cyanosis.   Lymphatic: No palpable lymph nodes.  Skin: No rashes or lesions.  Psychological: Normal affect  Left buttock/perineal area: very indurated, tense skin with tenderness, left buttock extending to left perineum     Laboratory Values:  CBC:   Lab Results   Component Value Date    WBC 20.7 (H) 10/18/2024    RBC 4.67 10/18/2024    HGB 13.8 10/18/2024    HCT 41.1 10/18/2024     10/18/2024       RFP:   Lab Results   Component Value Date     (L) 10/18/2024    K 3.3 (L) 10/18/2024     10/18/2024    CO2 22 10/18/2024    BUN 14 10/18/2024    CREATININE 0.93 10/18/2024    CALCIUM 8.7 10/18/2024    MG 1.81 10/18/2024        LFTs: No results found for: \"PROT\", \"ALBUMIN\", \"BILITOT\", \"BILIDIR\", \"ALKPHOS\", \"AST\", \"ALT\"         Imaging:  I have personally reviewed the images and the radiologist's report.  CT pelvis w IV contrast    Result Date: 10/18/2024  Interpreted By:  Marcos Lowery, STUDY: CT PELVIS W IV CONTRAST;  10/18/2024 11:24 am   INDICATION: Signs/Symptoms:gluteus perineal abscess.     COMPARISON: None.   ACCESSION NUMBER(S): [id number]   ORDERING CLINICIAN: SHRUTHI WYNNE   TECHNIQUE: CT of the pelvis was performed.  Standard contiguous axial images were obtained at 3 mm slice thickness through pelvis. Coronal and sagittal reconstructions at 3 mm slice thickness were performed.  75 ML of Omnipaque 350 was administered intravenously without immediate complication.     FINDINGS: 9 x 1.8 cm collection consistent with abscess which is superficial along the left perineum there is extension of the previously mentioned collection superiorly along the left inner thigh. Subcutaneous stranding consistent with cellulitis is seen along the left gluteal region. The abscess extends along the left perirectal location but remains superficial.   No significant gas is noted " within the collection. The adjacent structures are intact.   The pelvic structures show normal appearance of the bowel. Bladder is within normal limits and normally distended. Normal appendix. Osseous structures are unremarkable.       Superficial collection consistent with abscess extending along the entirety of the left inner thigh, inferior left scrotum, left peroneal location, and left perirectal location. Adjacent subcutaneous stranding consistent with cellulitis.   MACRO: None   Signed by: Marcos Lowery 10/18/2024 12:25 PM Dictation workstation:   [id number]        Assessment:  This is a 39 y.o. male who has below conditions:  Recurrence left buttock/perineal abscess       Plan:  Supportive care   Npo, ivf  Abx   OR for abscess I and D.   Patient understood risks: infection, bleeding, injury to adjacent structures, possible wound healing issue, possible anal fistula which will need to be addressed later, risks of anesthesia       Phillip Mora MD [facility]  General Surgery  Office: [phone]  Fax:     [phone]  4:15 PM   10/18/24

## 2024-10-18 NOTE — ANESTHESIA PREPROCEDURE EVALUATION
Patient: Sherman Marsh    Procedure Information       Date/Time: 10/18/24 1655    Procedure: Incision and Drainage Anus/Rectum (Left) - left buttock abscess I and D, lithotomy    Location: [facility] OR [facility] / [facility]    Surgeons: Phillip Mora MD     Vitals:    10/18/24 1645   BP: 132/85   Pulse: 108   Resp: 16   Temp: 37.9 °C (100.2 °F)   SpO2: 97%       Past Surgical History:   Procedure Laterality Date   • ABCESS DRAINAGE  04/28/2024    PERINEAL CHRONIC LEFT GROIN I&D DR. WILSON @ [facility]   • ORIF WRIST FRACTURE Right      History reviewed. No pertinent past medical history.    Current Facility-Administered Medications:   •  [Transfer Hold] lactated Ringer's bolus 500 mL, 500 mL, intravenous, Once, Zurdo Johnson MD, Held Per Protocol at 10/18/24 1006  •  [Transfer Hold] lidocaine-epinephrine (Xylocaine W/EPI) 1 %-1:100,000 injection 20 mL, 20 mL, infiltration, Once, Zurdo Johnson MD  Prior to Admission medications    Medication Sig Start Date End Date Taking? Authorizing Provider   doxycycline (Vibramycin) 100 mg capsule Take 1 capsule (100 mg) by mouth 2 times a day for 10 days. Take with at least 8 ounces (large glass) of water, do not lie down for 30 minutes after 10/16/24 10/26/24  Regan Douglas PA-C   sod sulf-pot chloride-mag sulf (Sutab) 1.479-0.188- 0.225 gram tablet Take 12 tablets by mouth every 12 hours. Follow package instructions. 5/23/24   Larry Wilson MD     No Known Allergies  Social History     Tobacco Use   • Smoking status: Every Day     Current packs/day: 0.50     Average packs/day: 0.5 packs/day for 17.8 years (8.9 ttl pk-yrs)     Types: Cigarettes     Start date: 1/1/2007     Passive exposure: Past (5/20/24:  PT VERBALIZES HX SMOKE EXPOSURE GROWING UP [facility])   • Smokeless tobacco: Never   • Tobacco comments:     5/20/24: PT VERBALLY VERIFIES 10 CIGGS DAILY'ITZ [facility]   Substance Use Topics   • Alcohol use: Not Currently     Comment: 5/20/24: PT VERBALLY VERIFIES SOCIALY (ONCE A YEAR)  [facility] LPN         Chemistry    Lab Results   Component Value Date/Time     (L) 10/18/2024 0851    K 3.3 (L) 10/18/2024 0851     10/18/2024 0851    CO2 22 10/18/2024 0851    BUN 14 10/18/2024 0851    CREATININE 0.93 10/18/2024 0851    Lab Results   Component Value Date/Time    CALCIUM 8.7 10/18/2024 0851    ALKPHOS 30 (L) 04/28/2024 1043    AST 28 04/28/2024 1043    ALT 31 04/28/2024 1043    BILITOT 0.7 04/28/2024 1043          Lab Results   Component Value Date/Time    WBC 20.7 (H) 10/18/2024 0851    HGB 13.8 10/18/2024 0851    HCT 41.1 10/18/2024 0851     10/18/2024 0851     Lab Results   Component Value Date/Time    PROTIME 14.2 (H) 04/28/2024 1043    INR 1.3 (H) 04/28/2024 1043     No results found for this or any previous visit (from the past 4464 hours).  No results found for this or any previous visit from the past 1095 days.        Relevant Problems   No relevant active problems       Clinical information reviewed:   Tobacco  Allergies  Meds   Med Hx  Surg Hx   Fam Hx  Soc Hx        NPO Detail:  NPO/Void Status  Date of Last Liquid: 10/18/24  Time of Last Liquid: 1030  Date of Last Solid: 10/17/24  Last Intake Type: Clear fluids         Physical Exam    Airway  Mallampati: III  TM distance: >3 FB  Neck ROM: full     Cardiovascular - normal exam     Dental    Pulmonary - normal exam     Abdominal   (+) obese         Anesthesia Plan    History of general anesthesia?: yes  History of complications of general anesthesia?: no    ASA 2 - emergent     general     The patient is a current smoker.    Anesthetic plan and risks discussed with patient.    Plan discussed with CRNA and attending.

## 2024-10-18 NOTE — ANESTHESIA PROCEDURE NOTES
Airway  Date/Time: 10/18/2024 5:12 PM  Urgency: elective    Airway not difficult    Staffing  Performed: CRNA   Authorized by: Edmund Adair MD    Performed by: MARILOU Dominguez-LAYO  Patient location during procedure: OR    Indications and Patient Condition  Indications for airway management: anesthesia and airway protection  Spontaneous Ventilation: absent  Sedation level: deep  Preoxygenated: yes  Patient position: reverse Trendelenburg  Mask difficulty assessment: 0 - not attempted    Final Airway Details  Final airway type: endotracheal airway      Successful airway: ETT  Cuffed: yes   Successful intubation technique: video laryngoscopy  Facilitating devices/methods: intubating stylet  Endotracheal tube insertion site: oral  Blade size: #4  ETT size (mm): 7.5  Cormack-Lehane Classification: grade I - full view of glottis  Placement verified by: chest auscultation and capnometry   Cuff volume (mL): 8  Measured from: lips  ETT to lips (cm): 23  Number of attempts at approach: 1

## 2024-10-18 NOTE — DISCHARGE INSTRUCTIONS
Please call or ED if fever, chills, dizziness, bleeding from wound, worsening pain, or any concern   Please pull packing (kerlix gauze) 3 inches daily and cut it but leave end outside of wound, cover the wound with ABD pads, change daily and each time when it is wet or soiled after bowel movement   Continue doxycycline

## 2024-10-18 NOTE — ANESTHESIA POSTPROCEDURE EVALUATION
Patient: Sherman Marsh    Procedure Summary       Date: 10/18/24 Room / Location: [facility] OR [facility] / [facility]    Anesthesia Start: 1700 Anesthesia Stop: 1746    Procedure: Incision and Drainage Anus/Rectum (Left) Diagnosis:       Perineal abscess      (Perineal abscess [L02.215])    Surgeons: Phillip Mora MD Responsible Provider: Edmund Adair MD    Anesthesia Type: general ASA Status: 2 - Emergent            Anesthesia Type: general    Vitals Value Taken Time   /62 10/18/24 1754   Temp 37.5 °C (99.5 °F) 10/18/24 1742   Pulse 106 10/18/24 1754   Resp 22 10/18/24 1742   SpO2 95 % 10/18/24 1754   Vitals shown include unfiled device data.    Anesthesia Post Evaluation    Patient location during evaluation: PACU  Patient participation: complete - patient participated  Level of consciousness: awake  Pain score: 2  Pain management: adequate  Multimodal analgesia pain management approach  Airway patency: patent  Two or more strategies used to mitigate risk of obstructive sleep apnea  Cardiovascular status: acceptable  Respiratory status: acceptable  Hydration status: acceptable  Postoperative Nausea and Vomiting: none    No notable events documented.

## 2024-10-20 LAB
BACTERIA SPEC CULT: NORMAL
GRAM STN SPEC: NORMAL
GRAM STN SPEC: NORMAL

## 2024-10-21 LAB
B-LACTAMASE ORGANISM ISLT: POSITIVE
BACTERIA SPEC CULT: NORMAL
GRAM STN SPEC: NORMAL
GRAM STN SPEC: NORMAL

## 2024-10-22 ENCOUNTER — OFFICE VISIT (OUTPATIENT)
Dept: SURGERY | Facility: CLINIC | Age: 39
End: 2024-10-22

## 2024-10-22 VITALS
WEIGHT: 291.6 LBS | DIASTOLIC BLOOD PRESSURE: 90 MMHG | HEART RATE: 92 BPM | SYSTOLIC BLOOD PRESSURE: 142 MMHG | BODY MASS INDEX: 40.67 KG/M2 | OXYGEN SATURATION: 96 %

## 2024-10-22 DIAGNOSIS — Z09 POSTOPERATIVE EXAMINATION: ICD-10-CM

## 2024-10-22 DIAGNOSIS — K61.1 PERIRECTAL ABSCESS: Primary | ICD-10-CM

## 2024-10-22 PROCEDURE — 99024 POSTOP FOLLOW-UP VISIT: CPT | Performed by: SURGERY

## 2024-10-22 NOTE — PROGRESS NOTES
Subjective   Patient ID: Sherman Marsh is a 39 y.o. male who presents for Post-op (10/18/2024 incision and drainage anus/rectum ).  HPI  Had I and D for left buttock/perineal/perirectal abscess on 10/18  Feeling well   No complaint of pain   Wife helping pulling back packing from surgery. It fell off Sunday, wife is doing packing.     Review of Systems   All other systems reviewed and are negative.      Objective   Physical Exam  Constitutional:       Appearance: Normal appearance.   Pulmonary:      Effort: Pulmonary effort is normal.     Perianal/left buttock: wound clean, packing changed, not purulent.     Assessment/Plan   I and D for left buttock/perineal/perirectal abscess on 10/18  Doing well   Wife helping with packing and dressing change     Complete course of doxycycline  Culture pending   Follow up in wound center next Monday: phone number given   Continue wet to dry packing: supplies given   Patient had appointment with Dr Wilson on 11/1. He wants to follow up with her and discuss colonoscopy and further treatment. I discussed possible reason for recurrent abscess including anal fistula. He will need EUA at some point.   Call or ED if recurrent pain, infection or any concern        Phillip Mora MD 10/22/24 3:15 PM

## 2024-10-28 ENCOUNTER — TELEPHONE (OUTPATIENT)
Dept: SURGERY | Facility: CLINIC | Age: 39
End: 2024-10-28

## 2024-11-01 ENCOUNTER — APPOINTMENT (OUTPATIENT)
Dept: SURGERY | Facility: CLINIC | Age: 39
End: 2024-11-01

## 2024-11-01 VITALS
WEIGHT: 289.7 LBS | OXYGEN SATURATION: 96 % | HEART RATE: 100 BPM | DIASTOLIC BLOOD PRESSURE: 81 MMHG | BODY MASS INDEX: 40.56 KG/M2 | SYSTOLIC BLOOD PRESSURE: 127 MMHG | HEIGHT: 71 IN

## 2024-11-01 DIAGNOSIS — L02.215 PERINEAL ABSCESS: Primary | ICD-10-CM

## 2024-11-01 DIAGNOSIS — L03.317 CELLULITIS OF BUTTOCK: ICD-10-CM

## 2024-11-01 RX ORDER — SOD SULF/POT CHLORIDE/MAG SULF 1.479 G
12 TABLET ORAL EVERY 12 HOURS
Qty: 24 TABLET | Refills: 0 | Status: SHIPPED | OUTPATIENT
Start: 2024-11-01

## 2024-12-02 ENCOUNTER — ANESTHESIA EVENT (OUTPATIENT)
Dept: OPERATING ROOM | Facility: HOSPITAL | Age: 39
End: 2024-12-02

## 2024-12-02 RX ORDER — DROPERIDOL 2.5 MG/ML
0.62 INJECTION, SOLUTION INTRAMUSCULAR; INTRAVENOUS ONCE AS NEEDED
Status: CANCELLED | OUTPATIENT
Start: 2024-12-02

## 2024-12-02 RX ORDER — ONDANSETRON HYDROCHLORIDE 2 MG/ML
4 INJECTION, SOLUTION INTRAVENOUS ONCE AS NEEDED
Status: CANCELLED | OUTPATIENT
Start: 2024-12-02

## 2024-12-03 ENCOUNTER — HOSPITAL ENCOUNTER (OUTPATIENT)
Dept: OPERATING ROOM | Facility: HOSPITAL | Age: 39
Discharge: HOME | End: 2024-12-03

## 2024-12-03 ENCOUNTER — ANESTHESIA (OUTPATIENT)
Dept: OPERATING ROOM | Facility: HOSPITAL | Age: 39
End: 2024-12-03

## 2024-12-03 VITALS
BODY MASS INDEX: 40.03 KG/M2 | HEART RATE: 78 BPM | WEIGHT: 285.94 LBS | OXYGEN SATURATION: 97 % | HEIGHT: 71 IN | RESPIRATION RATE: 16 BRPM | SYSTOLIC BLOOD PRESSURE: 132 MMHG | TEMPERATURE: 97.2 F | DIASTOLIC BLOOD PRESSURE: 70 MMHG

## 2024-12-03 DIAGNOSIS — L02.215 PERINEAL ABSCESS: ICD-10-CM

## 2024-12-03 DIAGNOSIS — L03.317 CELLULITIS OF BUTTOCK: ICD-10-CM

## 2024-12-03 PROCEDURE — A45385 PR COLONOSCOPY,REMV LESN,SNARE: Performed by: NURSE ANESTHETIST, CERTIFIED REGISTERED

## 2024-12-03 PROCEDURE — 3600000002 HC OR TIME - INITIAL BASE CHARGE - PROCEDURE LEVEL TWO: Performed by: ANESTHESIOLOGY

## 2024-12-03 PROCEDURE — 7100000009 HC PHASE TWO TIME - INITIAL BASE CHARGE: Performed by: ANESTHESIOLOGY

## 2024-12-03 PROCEDURE — 45380 COLONOSCOPY AND BIOPSY: CPT | Performed by: SURGERY

## 2024-12-03 PROCEDURE — 7100000010 HC PHASE TWO TIME - EACH INCREMENTAL 1 MINUTE: Performed by: ANESTHESIOLOGY

## 2024-12-03 PROCEDURE — 3600000007 HC OR TIME - EACH INCREMENTAL 1 MINUTE - PROCEDURE LEVEL TWO: Performed by: ANESTHESIOLOGY

## 2024-12-03 PROCEDURE — 45385 COLONOSCOPY W/LESION REMOVAL: CPT | Performed by: SURGERY

## 2024-12-03 PROCEDURE — 3700000001 HC GENERAL ANESTHESIA TIME - INITIAL BASE CHARGE: Performed by: ANESTHESIOLOGY

## 2024-12-03 PROCEDURE — A45385 PR COLONOSCOPY,REMV LESN,SNARE: Performed by: ANESTHESIOLOGY

## 2024-12-03 PROCEDURE — 3700000002 HC GENERAL ANESTHESIA TIME - EACH INCREMENTAL 1 MINUTE: Performed by: ANESTHESIOLOGY

## 2024-12-03 PROCEDURE — 2500000004 HC RX 250 GENERAL PHARMACY W/ HCPCS (ALT 636 FOR OP/ED): Performed by: NURSE ANESTHETIST, CERTIFIED REGISTERED

## 2024-12-03 RX ORDER — LIDOCAINE HYDROCHLORIDE 10 MG/ML
INJECTION, SOLUTION EPIDURAL; INFILTRATION; INTRACAUDAL; PERINEURAL AS NEEDED
Status: DISCONTINUED | OUTPATIENT
Start: 2024-12-03 | End: 2024-12-03

## 2024-12-03 RX ORDER — MIDAZOLAM HYDROCHLORIDE 1 MG/ML
INJECTION INTRAMUSCULAR; INTRAVENOUS AS NEEDED
Status: DISCONTINUED | OUTPATIENT
Start: 2024-12-03 | End: 2024-12-03

## 2024-12-03 RX ORDER — PROPOFOL 10 MG/ML
INJECTION, EMULSION INTRAVENOUS AS NEEDED
Status: DISCONTINUED | OUTPATIENT
Start: 2024-12-03 | End: 2024-12-03

## 2024-12-03 RX ORDER — FENTANYL CITRATE 50 UG/ML
INJECTION, SOLUTION INTRAMUSCULAR; INTRAVENOUS AS NEEDED
Status: DISCONTINUED | OUTPATIENT
Start: 2024-12-03 | End: 2024-12-03

## 2024-12-03 SDOH — HEALTH STABILITY: MENTAL HEALTH: CURRENT SMOKER: 0

## 2024-12-03 ASSESSMENT — COLUMBIA-SUICIDE SEVERITY RATING SCALE - C-SSRS
2. HAVE YOU ACTUALLY HAD ANY THOUGHTS OF KILLING YOURSELF?: NO
1. IN THE PAST MONTH, HAVE YOU WISHED YOU WERE DEAD OR WISHED YOU COULD GO TO SLEEP AND NOT WAKE UP?: NO
6. HAVE YOU EVER DONE ANYTHING, STARTED TO DO ANYTHING, OR PREPARED TO DO ANYTHING TO END YOUR LIFE?: NO

## 2024-12-03 ASSESSMENT — PAIN SCALES - GENERAL: PAIN_LEVEL: 2

## 2024-12-03 NOTE — H&P
Bariatric/General Surgery H&P    12/3/2024 10:38 AM  Sherman Marsh  [id number]    Sherman Marsh is a 39 y.o. year old male with several perianal/perirectal abscesses.    PAST MEDICAL HISTORY:  History reviewed. No pertinent past medical history.     PAST SURGICAL HISTORY:  Past Surgical History:   Procedure Laterality Date    ABCESS DRAINAGE  2024    PERINEAL CHRONIC LEFT GROIN I&D DR. SALAZAR @ [facility]    ABCESS DRAINAGE N/A 10/18/2024    ORIF WRIST FRACTURE Right     3 screws and metal plate       FAMILY HISTORY:  No family history on file.     SOCIAL HISTORY:  Social History     Tobacco Use    Smoking status: Former     Current packs/day: 0.00     Average packs/day: 0.5 packs/day for 17.8 years (8.9 ttl pk-yrs)     Types: Cigarettes     Start date: 2007     Quit date: 2024     Years since quittin.0     Passive exposure: Past (24:  PT VERBALIZES HX SMOKE EXPOSURE GROWING UP [facility])    Smokeless tobacco: Never    Tobacco comments:     10/28/24: PT VERBALLY VERIFIES 4-6 CIGGS DAILY'ITZ [facility] RN   Vaping Use    Vaping status: Every Day    Substances: Nicotine   Substance Use Topics    Alcohol use: Not Currently     Comment: 10/28/24: PT VERBALLY VERIFIES SOCIALY (ONCE A YEAR) [facility] RN    Drug use: Never     Comment: 10/28/24:  PT VERBALLY VERIFIES [facility] RN       MEDICATIONS:  Prior to Admission Medications:  [unfilled]    ALLERGIES:  No Known Allergies    REVIEW OF SYSTEMS:  GENERAL: Negative for malaise, significant weight loss and fever  NECK: Negative for lumps, goiter, pain and significant neck swelling  RESPIRATORY: Negative for cough, wheezing or shortness of breath.  CARDIOVASCULAR: Negative for chest pain, leg swelling or palpitations.  GI: Negative for abdominal discomfort, blood in stools or black stools or change in bowel habits  : No history of dysuria, frequency or incontinence  MUSCULOSKELETAL: Negative for joint pain or swelling, back pain or muscle pain.  SKIN: Negative for  lesions, rash, and itching.  PSYCH: Negative for sleep disturbance, mood disorder and recent psychosocial stressors.  ENDOCRINE: Negative for cold or heat intolerance, polyuria, polydipsia and goiter.    PHYSICAL EXAM:  Vitals:    12/03/24 0943   BP: (!) 141/92   Pulse: 92   Resp: 16   Temp: 36.1 °C (97 °F)   SpO2: 97%     General appearance: obese  Skin: warm, no erythema or rashes  Lungs: clear to percussion and auscultation  Heart: regular rhythm and S1, S2 normal  Abdomen: soft, non-tender, no masses, no organomegaly  Extremities: Normal exam of the extremities. No swelling or pain.    IMPRESSION:  Sherman Marsh is a 39 y.o. male with recurrent perianal abscess disease.    PLAN:  Colonoscopy to rule out crohn's disease as etiology of his recurrent perianal abscess disease.    The risks of the procedure including bleeding, perforation, need for further procedure and death have been explained to the patient and Sherman Marsh has expressed understanding and acceptance of them. Consent has been signed.    Larry Wilson MD  Bariatric and Minimally Invasive General Surgery

## 2024-12-03 NOTE — ANESTHESIA PREPROCEDURE EVALUATION
Patient: Sherman Marsh    Procedure Information       Date/Time: 24 0925    Scheduled providers: Larry Wilson MD; Edmund Adair MD    Procedure: COLONOSCOPY    Location: [facility] OR     Vitals:    24 0943   BP: (!) 141/92   Pulse: 92   Resp: 16   Temp: 36.1 °C (97 °F)   SpO2: 97%       Past Surgical History:   Procedure Laterality Date   • ABCESS DRAINAGE  2024    [place]INEAL CH[place]IC [place] [facility] [place]   • ABCESS DRAINAGE N/A 10/18/2024   • ORIF WRIST FRACTURE Right     3 screws and metal plate     History reviewed. No pertinent past medical history.    Current Outpatient Medications:   •  sod sulf-pot chloride-mag sulf (Sutab) 1.479-0.188- 0.225 gram tablet, Take 12 tablets by mouth every 12 hours. Follow package instructions., Disp: 24 tablet, Rfl: 0  Prior to Admission medications    Medication Sig Start Date End Date Taking? Authorizing Provider   sod sulf-pot chloride-mag sulf (Sutab) 1.479-0.188- 0.225 gram tablet Take 12 tablets by mouth every 12 hours. Follow package instructions. 24   Larry Wilson MD     No Known Allergies  Social History     Tobacco Use   • Smoking status: Former     Current packs/day: 0.00     Average packs/day: 0.5 packs/day for 17.8 years (8.9 ttl pk-yrs)     Types: Cigarettes     Start date: 2007     Quit date: 2024     Years since quittin.0     Passive exposure: Past (24:  PT VERBALIZES HX SMOKE EXPOSURE GROWING UP [facility])   • Smokeless tobacco: Never   • Tobacco comments:     10/28/24: PT VERBALLY VERIFIES 4-6 CIGGS DAILY'ITZ ETTA RN   Substance Use Topics   • Alcohol use: Not Currently     Comment: 10/28/24: PT VERBALLY VERIFIES SOCIALY (ONCE A YEAR) ETTA RN         Chemistry    Lab Results   Component Value Date/Time     (L) 10/18/2024 0851    K 3.3 (L) 10/18/2024 0851     10/18/2024 0851    CO2 22 10/18/2024 0851    BUN 14 10/18/2024 0851    CREATININE 0.93 10/18/2024 0851    Lab Results    Component Value Date/Time    CALCIUM 8.7 10/18/2024 0851    ALKPHOS 30 (L) 04/28/2024 1043    AST 28 04/28/2024 1043    ALT 31 04/28/2024 1043    BILITOT 0.7 04/28/2024 1043          Lab Results   Component Value Date/Time    WBC 20.7 (H) 10/18/2024 0851    HGB 13.8 10/18/2024 0851    HCT 41.1 10/18/2024 0851     10/18/2024 0851     Lab Results   Component Value Date/Time    PROTIME 14.2 (H) 04/28/2024 1043    INR 1.3 (H) 04/28/2024 1043     No results found for this or any previous visit (from the past 4464 hours).  No results found for this or any previous visit from the past 1095 days.        Relevant Problems   No relevant active problems       Clinical information reviewed:   Tobacco  Allergies  Meds   Med Hx  Surg Hx   Fam Hx  Soc Hx        NPO Detail:  NPO/Void Status  Date of Last Liquid: 12/03/24  Time of Last Liquid: 0730  Date of Last Solid: 12/02/24  Time of Last Solid: 1200 (pizza)         Physical Exam    Airway  Mallampati: III  TM distance: >3 FB  Neck ROM: full     Cardiovascular - normal exam     Dental    Pulmonary - normal exam     Abdominal   (+) obese         Anesthesia Plan    History of general anesthesia?: yes  History of complications of general anesthesia?: no    ASA 2     MAC     The patient is not a current smoker.    intravenous induction     Plan discussed with CRNA and attending.

## 2024-12-03 NOTE — ANESTHESIA POSTPROCEDURE EVALUATION
Patient: Sherman Marsh    Procedure Summary       Date: 12/03/24 Room / Location: [facility] OR    Anesthesia Start: 1048 Anesthesia Stop: 1140    Procedure: COLONOSCOPY Diagnosis:       Cellulitis of buttock      Perineal abscess    Scheduled Providers: Larry Wilson MD; Edmund Adair MD Responsible Provider: Edmund Adair MD    Anesthesia Type: MAC ASA Status: 2            Anesthesia Type: MAC    Vitals Value Taken Time   /70 12/03/24 1145   Temp 36.2 °C (97.2 °F) 12/03/24 1139   Pulse 78 12/03/24 1145   Resp 16 12/03/24 1145   SpO2  12/03/24 1259       Anesthesia Post Evaluation    Patient location during evaluation: PACU  Patient participation: complete - patient participated  Level of consciousness: awake  Pain score: 2  Pain management: adequate  Multimodal analgesia pain management approach  Airway patency: patent  Two or more strategies used to mitigate risk of obstructive sleep apnea  Cardiovascular status: acceptable  Respiratory status: acceptable  Hydration status: acceptable  Postoperative Nausea and Vomiting: none      There were no known notable events for this encounter.

## 2024-12-12 LAB
LABORATORY COMMENT REPORT: NORMAL
PATH REPORT.FINAL DX SPEC: NORMAL
PATH REPORT.GROSS SPEC: NORMAL
PATH REPORT.RELEVANT HX SPEC: NORMAL
PATH REPORT.TOTAL CANCER: NORMAL

## 2025-06-08 ENCOUNTER — HOSPITAL ENCOUNTER (EMERGENCY)
Facility: HOSPITAL | Age: 40
Discharge: HOME | End: 2025-06-08

## 2025-06-08 VITALS
DIASTOLIC BLOOD PRESSURE: 60 MMHG | BODY MASS INDEX: 36.4 KG/M2 | OXYGEN SATURATION: 96 % | RESPIRATION RATE: 16 BRPM | HEIGHT: 71 IN | SYSTOLIC BLOOD PRESSURE: 102 MMHG | TEMPERATURE: 97.3 F | WEIGHT: 260 LBS | HEART RATE: 95 BPM

## 2025-06-08 DIAGNOSIS — L02.91 ABSCESS: Primary | ICD-10-CM

## 2025-06-08 PROCEDURE — 99283 EMERGENCY DEPT VISIT LOW MDM: CPT | Mod: 25

## 2025-06-08 PROCEDURE — 10060 I&D ABSCESS SIMPLE/SINGLE: CPT

## 2025-06-08 PROCEDURE — 2500000004 HC RX 250 GENERAL PHARMACY W/ HCPCS (ALT 636 FOR OP/ED)

## 2025-06-08 RX ORDER — LIDOCAINE HYDROCHLORIDE 10 MG/ML
INJECTION, SOLUTION INFILTRATION; PERINEURAL
Status: COMPLETED
Start: 2025-06-08 | End: 2025-06-08

## 2025-06-08 RX ORDER — DOXYCYCLINE 100 MG/1
100 CAPSULE ORAL 2 TIMES DAILY
Qty: 20 CAPSULE | Refills: 0 | Status: SHIPPED | OUTPATIENT
Start: 2025-06-08 | End: 2025-06-18

## 2025-06-08 RX ORDER — LIDOCAINE HYDROCHLORIDE 10 MG/ML
10 INJECTION, SOLUTION INFILTRATION; PERINEURAL ONCE
Status: COMPLETED | OUTPATIENT
Start: 2025-06-08 | End: 2025-06-08

## 2025-06-08 RX ADMIN — LIDOCAINE HYDROCHLORIDE 10 ML: 10 INJECTION, SOLUTION INFILTRATION; PERINEURAL at 10:34

## 2025-06-08 ASSESSMENT — PAIN - FUNCTIONAL ASSESSMENT: PAIN_FUNCTIONAL_ASSESSMENT: 0-10

## 2025-06-08 ASSESSMENT — LIFESTYLE VARIABLES
EVER FELT BAD OR GUILTY ABOUT YOUR DRINKING: NO
EVER HAD A DRINK FIRST THING IN THE MORNING TO STEADY YOUR NERVES TO GET RID OF A HANGOVER: NO
HAVE PEOPLE ANNOYED YOU BY CRITICIZING YOUR DRINKING: NO
HAVE YOU EVER FELT YOU SHOULD CUT DOWN ON YOUR DRINKING: NO
TOTAL SCORE: 0

## 2025-06-08 ASSESSMENT — PAIN SCALES - GENERAL
PAINLEVEL_OUTOF10: 1
PAINLEVEL_OUTOF10: 0 - NO PAIN
PAINLEVEL_OUTOF10: 0 - NO PAIN

## 2025-06-08 NOTE — ED PROVIDER NOTES
HPI   Chief Complaint   Patient presents with    Abscess     Left side sarah rectal abscess that started Friday. Has H/O abscess to that area with sx and I&D       Patient is a 39-year-old male with no significant PMH presents to the ED for abscess to left groin x 2 days.  Patient states he has history of abscesses and has had needed them drained multiple times.  Patient has had surgery on this area twice for abscesses.  Patient states it is uncomfortable when he sitting.  Patient has not noticed any drainage from the area.  Denies any injury to the area.  Patient denies any fevers or chills nausea vomiting abdominal pain or other complaints.              Patient History   Medical History[1]  Surgical History[2]  Family History[3]  Social History[4]    Physical Exam   ED Triage Vitals [06/08/25 0912]   Temperature Heart Rate Respirations BP   36.3 °C (97.3 °F) 96 17 121/76      Pulse Ox Temp Source Heart Rate Source Patient Position   98 % Temporal Monitor --      BP Location FiO2 (%)     -- --       Physical Exam  Pulmonary:      Effort: Pulmonary effort is normal.   Abdominal:      Palpations: Abdomen is soft.      Tenderness: There is no abdominal tenderness. There is no guarding or rebound.   Skin:     Findings: Lesion present.      Comments: Small indurated lesion left of the perineum no fluctuance no drainage no erythema   Neurological:      Mental Status: He is alert.           ED Course & MDM   Diagnoses as of 06/08/25 0931   Abscess                 No data recorded     [place] Coma Scale Score: 15 (06/08/25 0917 : Sabine Hardy, RN)                           Medical Decision Making  Medical Decision Making:  Patient presented as described in HPI. Patient case including ROS, PE, and treatment and plan discussed with ED attending if attached as cosigner. Due to patients presentation orders completed include as documented.  Patient presents to the ED for concern of abscess in his left groin.  There is no  fluctuance on exam, I used point-of-care ultrasound unable to see an area of drainage.  I educated patient on this and educated I will likely not get any pus out of I I&D the area.  Patient would still like me to try.  Area was cleansed.  Lidocaine without epi was injected.  Area was lanced moderate amount of blood was drained very scant purulent drainage.  Patient given doxycycline for home educated on any recent symptoms to return given surgery as follow-up.  Patient remained stable on discharge.  Patient was advised to follow up with PCP or recommended provider in 2-3 days for another evaluation and exam. I advised patient/guardian to return or go to closest emergency room immediately if symptoms change, get worse, new symptoms develop prior to follow up. If there is no improvement in symptoms in the next 24 hours they are advised to return for further evaluation and exam. I also explained the plan and treatment course. Patient/guardian is in agreement with plan, treatment course, and follow up and states verbally that they will comply.      Patient care discussed with: N/A  Social Determinants affecting care: N/A    Final diagnosis and disposition as below.  See CI    Homegoing. I discussed the differential; results and discharge plan with the patient and/or family/friend/caregiver if present.  I emphasized the importance of follow-up with the physician I referred them to in the timeframe recommended.  I explained reasons for the patient to return to the Emergency Department. They agreed that if they feel their condition is worsening or if they have any other concern they should call 911 immediately for further assistance. I gave the patient an opportunity to ask all questions they had and answered all of them accordingly. They understand return precautions and discharge instructions. The patient and/or family/friend/caregiver expressed understanding verbally and that they would comply.        Disposition:  Discharge        This note has been transcribed using voice recognition and may contain grammatical errors, misplaced words, incorrect words, incorrect phrases or other errors.          Procedure  Procedures       [1] History reviewed. No pertinent past medical history.  [2]   Past Surgical History:  Procedure Laterality Date    ABCESS DRAINAGE  2024    PERINEAL CHRONIC LEFT GROIN [place] [facility] [place]    ABCESS DRAINAGE N/A 10/18/2024    ORIF WRIST FRACTURE Right     3 screws and metal plate   [3] No family history on file.  [4]   Social History  Tobacco Use    Smoking status: Former     Current packs/day: 0.00     Average packs/day: 0.5 packs/day for 17.8 years (8.9 ttl pk-yrs)     Types: Cigarettes     Start date: 2007     Quit date: 2024     Years since quittin.6     Passive exposure: Past (24:  PT VERBALIZES HX SMOKE EXPOSURE GROWING UP [facility])    Smokeless tobacco: Never    Tobacco comments:     10/28/24: PT VERBALLY VERIFIES 4-6 CIGGS DAILY'ITZ ETTA RN   Vaping Use    Vaping status: Every Day    Substances: Nicotine   Substance Use Topics    Alcohol use: Not Currently     Comment: 10/28/24: PT VERBALLY VERIFIES SOCIALY (ONCE A YEAR) ETTA RN    Drug use: Never     Comment: 10/28/24:  PT VERBALLY VERIFIES ETTA Arroyo PA-C  25 0936

## 2025-06-18 ENCOUNTER — HOSPITAL ENCOUNTER (EMERGENCY)
Facility: HOSPITAL | Age: 40
Discharge: HOME | End: 2025-06-18
Attending: STUDENT IN AN ORGANIZED HEALTH CARE EDUCATION/TRAINING PROGRAM

## 2025-06-18 ENCOUNTER — APPOINTMENT (OUTPATIENT)
Dept: RADIOLOGY | Facility: HOSPITAL | Age: 40
End: 2025-06-18

## 2025-06-18 VITALS
HEIGHT: 71 IN | WEIGHT: 265 LBS | RESPIRATION RATE: 18 BRPM | SYSTOLIC BLOOD PRESSURE: 120 MMHG | OXYGEN SATURATION: 97 % | TEMPERATURE: 99.3 F | BODY MASS INDEX: 37.1 KG/M2 | DIASTOLIC BLOOD PRESSURE: 85 MMHG | HEART RATE: 113 BPM

## 2025-06-18 DIAGNOSIS — L02.91 ABSCESS: Primary | ICD-10-CM

## 2025-06-18 DIAGNOSIS — D72.829 LEUKOCYTOSIS, UNSPECIFIED TYPE: ICD-10-CM

## 2025-06-18 DIAGNOSIS — Z78.9 FAILURE OF OUTPATIENT TREATMENT: ICD-10-CM

## 2025-06-18 DIAGNOSIS — L03.90 CELLULITIS, UNSPECIFIED CELLULITIS SITE: ICD-10-CM

## 2025-06-18 LAB
ALBUMIN SERPL BCP-MCNC: 4.1 G/DL (ref 3.4–5)
ALP SERPL-CCNC: 35 U/L (ref 33–120)
ALT SERPL W P-5'-P-CCNC: 34 U/L (ref 10–52)
ANION GAP SERPL CALC-SCNC: 12 MMOL/L (ref 10–20)
APPEARANCE UR: CLEAR
APTT PPP: 34 SECONDS (ref 26–36)
AST SERPL W P-5'-P-CCNC: 21 U/L (ref 9–39)
BASOPHILS # BLD AUTO: 0.07 X10*3/UL (ref 0–0.1)
BASOPHILS NFR BLD AUTO: 0.4 %
BILIRUB SERPL-MCNC: 0.3 MG/DL (ref 0–1.2)
BILIRUB UR STRIP.AUTO-MCNC: NEGATIVE MG/DL
BUN SERPL-MCNC: 14 MG/DL (ref 6–23)
CALCIUM SERPL-MCNC: 9 MG/DL (ref 8.6–10.3)
CHLORIDE SERPL-SCNC: 101 MMOL/L (ref 98–107)
CO2 SERPL-SCNC: 28 MMOL/L (ref 21–32)
COLOR UR: ABNORMAL
CREAT SERPL-MCNC: 0.96 MG/DL (ref 0.5–1.3)
EGFRCR SERPLBLD CKD-EPI 2021: >90 ML/MIN/1.73M*2
EOSINOPHIL # BLD AUTO: 0.13 X10*3/UL (ref 0–0.7)
EOSINOPHIL NFR BLD AUTO: 0.7 %
ERYTHROCYTE [DISTWIDTH] IN BLOOD BY AUTOMATED COUNT: 12.7 % (ref 11.5–14.5)
GLUCOSE SERPL-MCNC: 84 MG/DL (ref 74–99)
GLUCOSE UR STRIP.AUTO-MCNC: NORMAL MG/DL
HCT VFR BLD AUTO: 43.8 % (ref 41–52)
HGB BLD-MCNC: 14.6 G/DL (ref 13.5–17.5)
IMM GRANULOCYTES # BLD AUTO: 0.08 X10*3/UL (ref 0–0.7)
IMM GRANULOCYTES NFR BLD AUTO: 0.5 % (ref 0–0.9)
INR PPP: 1 (ref 0.9–1.1)
KETONES UR STRIP.AUTO-MCNC: NEGATIVE MG/DL
LACTATE SERPL-SCNC: 1.1 MMOL/L (ref 0.4–2)
LEUKOCYTE ESTERASE UR QL STRIP.AUTO: NEGATIVE
LYMPHOCYTES # BLD AUTO: 2.95 X10*3/UL (ref 1.2–4.8)
LYMPHOCYTES NFR BLD AUTO: 17 %
MAGNESIUM SERPL-MCNC: 1.99 MG/DL (ref 1.6–2.4)
MCH RBC QN AUTO: 29.8 PG (ref 26–34)
MCHC RBC AUTO-ENTMCNC: 33.3 G/DL (ref 32–36)
MCV RBC AUTO: 89 FL (ref 80–100)
MONOCYTES # BLD AUTO: 1.64 X10*3/UL (ref 0.1–1)
MONOCYTES NFR BLD AUTO: 9.4 %
MUCOUS THREADS #/AREA URNS AUTO: ABNORMAL /LPF
NEUTROPHILS # BLD AUTO: 12.51 X10*3/UL (ref 1.2–7.7)
NEUTROPHILS NFR BLD AUTO: 72 %
NITRITE UR QL STRIP.AUTO: NEGATIVE
NRBC BLD-RTO: 0 /100 WBCS (ref 0–0)
PH UR STRIP.AUTO: 7 [PH]
PLATELET # BLD AUTO: 251 X10*3/UL (ref 150–450)
POTASSIUM SERPL-SCNC: 3.8 MMOL/L (ref 3.5–5.3)
PROT SERPL-MCNC: 7.8 G/DL (ref 6.4–8.2)
PROT UR STRIP.AUTO-MCNC: NEGATIVE MG/DL
PROTHROMBIN TIME: 11.3 SECONDS (ref 9.8–12.4)
RBC # BLD AUTO: 4.9 X10*6/UL (ref 4.5–5.9)
RBC # UR STRIP.AUTO: ABNORMAL MG/DL
RBC #/AREA URNS AUTO: >20 /HPF
SODIUM SERPL-SCNC: 137 MMOL/L (ref 136–145)
SP GR UR STRIP.AUTO: 1.04
UROBILINOGEN UR STRIP.AUTO-MCNC: NORMAL MG/DL
WBC # BLD AUTO: 17.4 X10*3/UL (ref 4.4–11.3)
WBC #/AREA URNS AUTO: ABNORMAL /HPF

## 2025-06-18 PROCEDURE — 81001 URINALYSIS AUTO W/SCOPE: CPT | Performed by: STUDENT IN AN ORGANIZED HEALTH CARE EDUCATION/TRAINING PROGRAM

## 2025-06-18 PROCEDURE — 36415 COLL VENOUS BLD VENIPUNCTURE: CPT | Performed by: NURSE PRACTITIONER

## 2025-06-18 PROCEDURE — 2500000001 HC RX 250 WO HCPCS SELF ADMINISTERED DRUGS (ALT 637 FOR MEDICARE OP)

## 2025-06-18 PROCEDURE — 85610 PROTHROMBIN TIME: CPT | Performed by: NURSE PRACTITIONER

## 2025-06-18 PROCEDURE — 2550000001 HC RX 255 CONTRASTS: Performed by: STUDENT IN AN ORGANIZED HEALTH CARE EDUCATION/TRAINING PROGRAM

## 2025-06-18 PROCEDURE — 83735 ASSAY OF MAGNESIUM: CPT | Performed by: NURSE PRACTITIONER

## 2025-06-18 PROCEDURE — 87070 CULTURE OTHR SPECIMN AEROBIC: CPT | Mod: GEALAB | Performed by: STUDENT IN AN ORGANIZED HEALTH CARE EDUCATION/TRAINING PROGRAM

## 2025-06-18 PROCEDURE — 99285 EMERGENCY DEPT VISIT HI MDM: CPT | Mod: 25 | Performed by: STUDENT IN AN ORGANIZED HEALTH CARE EDUCATION/TRAINING PROGRAM

## 2025-06-18 PROCEDURE — 74177 CT ABD & PELVIS W/CONTRAST: CPT | Mod: FOREIGN READ | Performed by: RADIOLOGY

## 2025-06-18 PROCEDURE — 2500000004 HC RX 250 GENERAL PHARMACY W/ HCPCS (ALT 636 FOR OP/ED): Performed by: NURSE PRACTITIONER

## 2025-06-18 PROCEDURE — 85025 COMPLETE CBC W/AUTO DIFF WBC: CPT | Performed by: NURSE PRACTITIONER

## 2025-06-18 PROCEDURE — 83605 ASSAY OF LACTIC ACID: CPT | Performed by: NURSE PRACTITIONER

## 2025-06-18 PROCEDURE — 10060 I&D ABSCESS SIMPLE/SINGLE: CPT

## 2025-06-18 PROCEDURE — 96360 HYDRATION IV INFUSION INIT: CPT | Mod: 59

## 2025-06-18 PROCEDURE — 85730 THROMBOPLASTIN TIME PARTIAL: CPT | Performed by: NURSE PRACTITIONER

## 2025-06-18 PROCEDURE — 2500000004 HC RX 250 GENERAL PHARMACY W/ HCPCS (ALT 636 FOR OP/ED)

## 2025-06-18 PROCEDURE — 10061 I&D ABSCESS COMP/MULTIPLE: CPT | Performed by: STUDENT IN AN ORGANIZED HEALTH CARE EDUCATION/TRAINING PROGRAM

## 2025-06-18 PROCEDURE — 80053 COMPREHEN METABOLIC PANEL: CPT | Performed by: NURSE PRACTITIONER

## 2025-06-18 PROCEDURE — 74177 CT ABD & PELVIS W/CONTRAST: CPT

## 2025-06-18 RX ORDER — KETOROLAC TROMETHAMINE 30 MG/ML
30 INJECTION, SOLUTION INTRAMUSCULAR; INTRAVENOUS ONCE
Status: DISCONTINUED | OUTPATIENT
Start: 2025-06-18 | End: 2025-06-18 | Stop reason: HOSPADM

## 2025-06-18 RX ORDER — AMOXICILLIN AND CLAVULANATE POTASSIUM 875; 125 MG/1; MG/1
1 TABLET, FILM COATED ORAL ONCE
Status: COMPLETED | OUTPATIENT
Start: 2025-06-18 | End: 2025-06-18

## 2025-06-18 RX ORDER — AMOXICILLIN AND CLAVULANATE POTASSIUM 875; 125 MG/1; MG/1
TABLET, FILM COATED ORAL
Status: COMPLETED
Start: 2025-06-18 | End: 2025-06-18

## 2025-06-18 RX ORDER — LIDOCAINE HYDROCHLORIDE 10 MG/ML
10 INJECTION, SOLUTION INFILTRATION; PERINEURAL ONCE
Status: COMPLETED | OUTPATIENT
Start: 2025-06-18 | End: 2025-06-18

## 2025-06-18 RX ORDER — LIDOCAINE HYDROCHLORIDE 10 MG/ML
INJECTION, SOLUTION INFILTRATION; PERINEURAL
Status: COMPLETED
Start: 2025-06-18 | End: 2025-06-18

## 2025-06-18 RX ORDER — ONDANSETRON HYDROCHLORIDE 2 MG/ML
4 INJECTION, SOLUTION INTRAVENOUS ONCE
Status: DISCONTINUED | OUTPATIENT
Start: 2025-06-18 | End: 2025-06-18 | Stop reason: HOSPADM

## 2025-06-18 RX ORDER — AMOXICILLIN AND CLAVULANATE POTASSIUM 875; 125 MG/1; MG/1
1 TABLET, FILM COATED ORAL EVERY 12 HOURS
Qty: 14 TABLET | Refills: 0 | Status: SHIPPED | OUTPATIENT
Start: 2025-06-18 | End: 2025-06-25

## 2025-06-18 RX ORDER — MORPHINE SULFATE 4 MG/ML
4 INJECTION INTRAVENOUS ONCE
Status: DISCONTINUED | OUTPATIENT
Start: 2025-06-18 | End: 2025-06-18 | Stop reason: HOSPADM

## 2025-06-18 RX ADMIN — SODIUM CHLORIDE 1000 ML: 0.9 INJECTION, SOLUTION INTRAVENOUS at 18:48

## 2025-06-18 RX ADMIN — IOHEXOL 75 ML: 350 INJECTION, SOLUTION INTRAVENOUS at 19:25

## 2025-06-18 RX ADMIN — LIDOCAINE HYDROCHLORIDE: 10 INJECTION, SOLUTION INFILTRATION; PERINEURAL at 20:43

## 2025-06-18 RX ADMIN — AMOXICILLIN AND CLAVULANATE POTASSIUM 1 TABLET: 875; 125 TABLET, FILM COATED ORAL at 20:42

## 2025-06-18 ASSESSMENT — PAIN - FUNCTIONAL ASSESSMENT
PAIN_FUNCTIONAL_ASSESSMENT: 0-10
PAIN_FUNCTIONAL_ASSESSMENT: 0-10

## 2025-06-18 ASSESSMENT — PAIN SCALES - GENERAL
PAINLEVEL_OUTOF10: 0 - NO PAIN
PAINLEVEL_OUTOF10: 3

## 2025-06-18 NOTE — ED PROVIDER NOTES
[facility]  ED  Encounter Note  Admit Date/RoomTime: 2025  6:01 PM  ED Room: AC03/AC03  NAME: Sherman Marsh  : 1985  MRN: [id number]     Chief Complaint:  Abscess    HISTORY OF PRESENT ILLNESS        Sherman Marsh is a 39 y.o. male who presents to the ED for evaluation of  left gluteal cleft abscess. Patient was in the ED but saw no improvement in symptoms. Has a tablet or two of doxy. Denied any drainage. Not sure about fever or chills. Has had before. Not sure about mrsa or DM.     ROS   Pertinent positives and negatives are stated within HPI, all other systems reviewed and are negative.    Past Medical History:  has no past medical history on file.    Surgical History:  has a past surgical history that includes Abscess drainage (2024); ORIF wrist fracture (Right); and Abscess drainage (N/A, 10/18/2024).    Social History:  reports that he quit smoking about 7 months ago. His smoking use included cigarettes. He started smoking about 18 years ago. He has a 8.9 pack-year smoking history. He has been exposed to tobacco smoke. He has never used smokeless tobacco. He reports that he does not currently use alcohol. He reports that he does not use drugs.    Family History: family history is not on file.     Allergies: Patient has no known allergies.    PHYSICAL EXAM   Oxygen Saturation Interpretation: Normal.      Large area on buttock area which is red hot and inflammed radiating up to groin    Physical Exam  Constitutional/General: Alert and oriented x3, well appearing, non toxic  HEENT:  NC/NT. PERRLA.  Airway patent.  Neck: Supple, full ROM. No midline vertebral tenderness or crepitus.   Respiratory: Lung sounds clear to auscultation bilaterally. No wheezes, rhonchi or stridor. Not in respiratory distress.  CV:  Regular rate. Regular rhythm. No murmurs or rubs. 2+ distal pulses.  GI:  Abdomen soft, non-tender, non-distended. +BS. No rebound, guarding, or  rigidity. No pulsatile masses.  Musculoskeletal: Moves all extremities x 4. Warm and well perfused. Capillary refill <3 seconds  Integument: Skin warm and dry. No rashes.   Neurologic: Alert and oriented with no focal deficits, symmetric strength 5/5 in the upper and lower extremities bilaterally.  Psychiatric: Normal affect.//buttock is red inflamed mai on the left buttock area and there are severe hard areas noted which could be locatulated areas of infection    Lab / Imaging Results   (All laboratory and radiology results have been personally reviewed by myself)  Labs:  Results for orders placed or performed during the hospital encounter of 06/18/25   CBC and Auto Differential    Collection Time: 06/18/25  6:47 PM   Result Value Ref Range    WBC 17.4 (H) 4.4 - 11.3 x10*3/uL    nRBC 0.0 0.0 - 0.0 /100 WBCs    RBC 4.90 4.50 - 5.90 x10*6/uL    Hemoglobin 14.6 13.5 - 17.5 g/dL    Hematocrit 43.8 41.0 - 52.0 %    MCV 89 80 - 100 fL    MCH 29.8 26.0 - 34.0 pg    MCHC 33.3 32.0 - 36.0 g/dL    RDW 12.7 11.5 - 14.5 %    Platelets 251 150 - 450 x10*3/uL    Neutrophils % 72.0 40.0 - 80.0 %    Immature Granulocytes %, Automated 0.5 0.0 - 0.9 %    Lymphocytes % 17.0 13.0 - 44.0 %    Monocytes % 9.4 2.0 - 10.0 %    Eosinophils % 0.7 0.0 - 6.0 %    Basophils % 0.4 0.0 - 2.0 %    Neutrophils Absolute 12.51 (H) 1.20 - 7.70 x10*3/uL    Immature Granulocytes Absolute, Automated 0.08 0.00 - 0.70 x10*3/uL    Lymphocytes Absolute 2.95 1.20 - 4.80 x10*3/uL    Monocytes Absolute 1.64 (H) 0.10 - 1.00 x10*3/uL    Eosinophils Absolute 0.13 0.00 - 0.70 x10*3/uL    Basophils Absolute 0.07 0.00 - 0.10 x10*3/uL   Magnesium    Collection Time: 06/18/25  6:47 PM   Result Value Ref Range    Magnesium 1.99 1.60 - 2.40 mg/dL   Comprehensive metabolic panel    Collection Time: 06/18/25  6:47 PM   Result Value Ref Range    Glucose 84 74 - 99 mg/dL    Sodium 137 136 - 145 mmol/L    Potassium 3.8 3.5 - 5.3 mmol/L    Chloride 101 98 - 107 mmol/L     Bicarbonate 28 21 - 32 mmol/L    Anion Gap 12 10 - 20 mmol/L    Urea Nitrogen 14 6 - 23 mg/dL    Creatinine 0.96 0.50 - 1.30 mg/dL    eGFR >90 >60 mL/min/1.73m*2    Calcium 9.0 8.6 - 10.3 mg/dL    Albumin 4.1 3.4 - 5.0 g/dL    Alkaline Phosphatase 35 33 - 120 U/L    Total Protein 7.8 6.4 - 8.2 g/dL    AST 21 9 - 39 U/L    Bilirubin, Total 0.3 0.0 - 1.2 mg/dL    ALT 34 10 - 52 U/L   Lactate    Collection Time: 06/18/25  6:47 PM   Result Value Ref Range    Lactate 1.1 0.4 - 2.0 mmol/L   Protime-INR    Collection Time: 06/18/25  6:47 PM   Result Value Ref Range    Protime 11.3 9.8 - 12.4 seconds    INR 1.0 0.9 - 1.1   aPTT    Collection Time: 06/18/25  6:47 PM   Result Value Ref Range    aPTT 34 26 - 36 seconds   Urinalysis with Reflex Culture and Microscopic    Collection Time: 06/18/25  8:52 PM   Result Value Ref Range    Color, Urine Light-Yellow Light-Yellow, Yellow, Dark-Yellow    Appearance, Urine Clear Clear    Specific Gravity, Urine 1.040 (N) 1.005 - 1.035    pH, Urine 7.0 5.0, 5.5, 6.0, 6.5, 7.0, 7.5, 8.0    Protein, Urine NEGATIVE NEGATIVE, 10 (TRACE), 20 (TRACE) mg/dL    Glucose, Urine Normal Normal mg/dL    Blood, Urine 0.2 (2+) (A) NEGATIVE mg/dL    Ketones, Urine NEGATIVE NEGATIVE mg/dL    Bilirubin, Urine NEGATIVE NEGATIVE mg/dL    Urobilinogen, Urine Normal Normal mg/dL    Nitrite, Urine NEGATIVE NEGATIVE    Leukocyte Esterase, Urine NEGATIVE NEGATIVE   Extra Urine Gray Tube    Collection Time: 06/18/25  8:52 PM   Result Value Ref Range    Extra Tube     Urinalysis Microscopic    Collection Time: 06/18/25  8:52 PM   Result Value Ref Range    WBC, Urine 1-5 1-5, NONE /HPF    RBC, Urine >20 (A) NONE, 1-2, 3-5 /HPF    Mucus, Urine FEW Reference range not established. /LPF   Tissue/Wound Culture/Smear    Collection Time: 06/18/25  8:57 PM    Specimen: Buttock; Tissue/Biopsy   Result Value Ref Range    Gram Stain (4+) Abundant Polymorphonuclear leukocytes (A)     Gram Stain (1+) Rare Mixed Gram positive  bacteria (A)      Imaging:  All Radiology results interpreted by Radiologist unless otherwise noted.  CT abdomen pelvis w IV contrast   Final Result   Edema and infiltration into the subcutaneous fat of the medial left   buttock.  There does appear to be a phlegmon present as well as a   small fluid collection inferiorly most likely representing a small   abscess.   4 mm proximal left ureteral calculus.   Mild hepatic steatosis.   Signed by Waylon Caban MD          ED Course / Medical Decision Making     Medications   sodium chloride 0.9 % bolus 1,000 mL (0 mL intravenous Stopped 6/18/25 1927)   iohexol (OMNIPaque) 350 mg iodine/mL solution 75 mL (75 mL intravenous Given 6/18/25 1925)   lidocaine (Xylocaine) 10 mg/mL (1 %) injection 10 mL ( infiltration Given 6/18/25 2043)   amoxicillin-clavulanate (Augmentin) 875-125 mg per tablet 1 tablet (1 tablet oral Given 6/18/25 2042)     ED Course as of 06/20/25 0808 Wed Jun 18, 2025 1921 Neutrophils Absolute(!): 12.51 [PK]   1921 WBC(!): 17.4 [PK]   1921 BP: 120/85 [PK]   1921 Temperature: 37.4 °C (99.3 °F) [PK]   1921 Heart Rate(!): 113 [PK]   1921 Respirations: 18 [PK]   1921 Pulse Ox: 97 % [PK]   2023 Edema and infiltration into the subcutaneous fat of the medial left  buttock.  There does appear to be a phlegmon present as well as a  small fluid collection inferiorly most likely representing a small  abscess.  4 mm proximal left ureteral calculus.   [HD]   2214 Patient informed of the kidney stone.  He states he has no pain in his left flank.  He states he has kidney stones all the time and passes them without difficulty.  He states that his abscess was draining at bedside.  On exam he still has a significant amount of fluctuance.  I&D done at bedside with a significant amount of purulent discharge.  Wound culture sent.  He is already on doxycycline therefore added Augmentin.  Patient requesting discharge.  Discharged in stable condition. [HD]      ED Course User  Index  [HD] Ashely Jad, DO  [PK] Michelle Molina, APRN-CNP         Diagnoses as of 06/20/25 0808   Abscess   Leukocytosis, unspecified type   Failure of outpatient treatment   Cellulitis, unspecified cellulitis site         MDM:       Wbc 17.4, rest of lab work imaging signed out to oncoming attending. Needs to have abscess likely re drained discussed with patient due to moderate amount of cellulitis/redness in left buttock concerned about cellulitis/perirectal abscess, disp pending imaging  Ddx: buttock cellulitis rectal abscess failure of outpatient     Plan of Care/Counseling:  I reviewed today's visit with the patient  in addition to providing specific details for the plan of care and counseling regarding the diagnosis and prognosis.  Questions are answered at this time and are agreeable with the plan.    ASSESSMENT     1. Abscess    2. Leukocytosis, unspecified type    3. Failure of outpatient treatment    4. Cellulitis, unspecified cellulitis site      PLAN   Home Advised to return for signs of head injury, weakness, numbness or tingling to extremities, incontinence and Advised to return for worsening or additional problems such as abdominal or chest pain  Diagnostic tests were reviewed and questions answered. Diagnosis, care plan and treatment options were discussed. The patient understand instructions and will follow up as directed.  Condition stable  The patient was given verbal follow-up instructions  Patient condition is stable    New Medications     New Medications Ordered This Visit   Medications    sodium chloride 0.9 % bolus 1,000 mL    iohexol (OMNIPaque) 350 mg iodine/mL solution 75 mL    lidocaine (Xylocaine) 10 mg/mL (1 %) injection 10 mL    lidocaine (Xylocaine) injection 10 mg/mL (1 %)  - Omnicell Override Pull     Created by cabinet override    amoxicillin-clavulanate (Augmentin) 875-125 mg per tablet 1 tablet     Suspected Indication (Select all that apply):   Cellulitis, Skin and Soft  Tissue    amoxicillin-clavulanate (Augmentin) 875-125 mg tablet     Sig: Take 1 tablet by mouth every 12 hours for 7 days.     Dispense:  14 tablet     Refill:  0    amoxicillin-clavulanate (Augmentin) per tablet 875-125 mg  - Omnicell Override Pull     Created by cabinet override     Electronically signed by ARMANDO Cannon   **This report was transcribed using voice recognition software. Every effort was made to ensure accuracy; however, inadvertent computerized transcription errors may be present.  END OF ED PROVIDER NOTE     ARMANDO Cannon  06/20/25 4535

## 2025-06-18 NOTE — ED TRIAGE NOTES
Pt BIB POV from home w/ c/o left gluteal cleft abscess. I&D a week ago. Believes it is filling back up. Denies fever/chills. Still the oral antibiotic. Ambulatory, speaking in full sentences. Oriented.

## 2025-06-19 LAB — HOLD SPECIMEN: NORMAL

## 2025-06-19 NOTE — DISCHARGE INSTRUCTIONS
Return to the emergency department if you develop new fevers, have significant worsening of symptoms, or for any other acute concerns.     Finish your course of doxycycline and take your Augmentin.  Follow-up with Dr. Mora.

## 2025-06-19 NOTE — ED PROCEDURE NOTE
Procedure  Incision and Drainage    Performed by: Ashely Street DO  Authorized by: Ashely Street DO    Consent:     Consent obtained:  Verbal    Consent given by:  Patient    Risks, benefits, and alternatives were discussed: yes      Risks discussed:  Bleeding, incomplete drainage and pain  Universal protocol:     Patient identity confirmed:  Verbally with patient  Location:     Type:  Abscess    Size:  2    Location:  Anogenital    Anogenital location:  Gluteal cleft  Pre-procedure details:     Skin preparation:  Chlorhexidine  Sedation:     Sedation type:  None  Anesthesia:     Anesthesia method:  Local infiltration    Local anesthetic:  Lidocaine 1% w/o epi  Procedure type:     Complexity:  Complex  Procedure details:     Ultrasound guidance: no      Needle aspiration: no      Incision types:  Single straight    Incision depth:  Subcutaneous    Wound management:  Probed and irrigated with saline    Drainage:  Purulent    Drainage amount:  Moderate    Wound treatment:  Drain placed    Packing materials:  1/4 in iodoform gauze  Post-procedure details:     Procedure completion:  Tolerated               Ashely Street DO  06/18/25 2218

## 2025-06-21 LAB
B-LACTAMASE ORGANISM ISLT: POSITIVE
BACTERIA SPEC CULT: ABNORMAL
GRAM STN SPEC: ABNORMAL
GRAM STN SPEC: ABNORMAL

## (undated) DEVICE — IRRIGATION SYSTEM, WOUND, SURGIPHOR, 450ML, STERILE

## (undated) DEVICE — DRAPE PACK, MINOR, CUSTOM, GEAUGA

## (undated) DEVICE — DRAPE, LEGGINGS, 48 X 31 IN, STERILE, LF

## (undated) DEVICE — SYRINGE, 20 CC, LUER LOCK, MONOJECT, W/O CAP, LF

## (undated) DEVICE — DRAPE, SHEET, FAN FOLDED, HALF, 44 X 58 IN, DISPOSABLE, LF, STERILE

## (undated) DEVICE — DRAPE, SHEET, THYROID, W/ARMBOARD COVER, 100 X 121 IN, DISPOSABLE, LF, STERILE

## (undated) DEVICE — Device

## (undated) DEVICE — CAUTERY, PENCIL, PUSH BUTTON, SMOKE EVAC, 70MM

## (undated) DEVICE — PREP TRAY, SKIN, DRY, W/GLOVES